# Patient Record
Sex: MALE | URBAN - METROPOLITAN AREA
[De-identification: names, ages, dates, MRNs, and addresses within clinical notes are randomized per-mention and may not be internally consistent; named-entity substitution may affect disease eponyms.]

---

## 2018-12-18 ENCOUNTER — INPATIENT (INPATIENT)
Facility: HOSPITAL | Age: 63
LOS: 2 days | DRG: 208 | End: 2018-12-21
Payer: COMMERCIAL

## 2018-12-18 VITALS — TEMPERATURE: 99 F

## 2018-12-18 DIAGNOSIS — Z78.9 OTHER SPECIFIED HEALTH STATUS: ICD-10-CM

## 2018-12-18 DIAGNOSIS — Z66 DO NOT RESUSCITATE: ICD-10-CM

## 2018-12-18 DIAGNOSIS — J96.90 RESPIRATORY FAILURE, UNSPECIFIED, UNSPECIFIED WHETHER WITH HYPOXIA OR HYPERCAPNIA: ICD-10-CM

## 2018-12-18 DIAGNOSIS — Z51.5 ENCOUNTER FOR PALLIATIVE CARE: ICD-10-CM

## 2018-12-18 DIAGNOSIS — Z71.0 PERSON ENCOUNTERING HEALTH SERVICES TO CONSULT ON BEHALF OF ANOTHER PERSON: ICD-10-CM

## 2018-12-18 DIAGNOSIS — Z71.89 OTHER SPECIFIED COUNSELING: ICD-10-CM

## 2018-12-18 DIAGNOSIS — I46.9 CARDIAC ARREST, CAUSE UNSPECIFIED: ICD-10-CM

## 2018-12-18 LAB
ALBUMIN SERPL ELPH-MCNC: 3 G/DL — LOW (ref 3.3–5)
ALBUMIN SERPL ELPH-MCNC: 3.1 G/DL — LOW (ref 3.3–5)
ALP SERPL-CCNC: 89 U/L — SIGNIFICANT CHANGE UP (ref 40–120)
ALP SERPL-CCNC: 94 U/L — SIGNIFICANT CHANGE UP (ref 40–120)
ALT FLD-CCNC: 258 U/L — HIGH (ref 10–45)
ALT FLD-CCNC: 300 U/L — HIGH (ref 10–45)
ANION GAP SERPL CALC-SCNC: 15 MMOL/L — SIGNIFICANT CHANGE UP (ref 5–17)
ANION GAP SERPL CALC-SCNC: 21 MMOL/L — HIGH (ref 5–17)
ANION GAP SERPL CALC-SCNC: 22 MMOL/L — HIGH (ref 5–17)
ANISOCYTOSIS BLD QL: SLIGHT — SIGNIFICANT CHANGE UP
APTT BLD: 103.5 SEC — HIGH (ref 27.5–36.3)
AST SERPL-CCNC: 354 U/L — HIGH (ref 10–40)
AST SERPL-CCNC: 472 U/L — HIGH (ref 10–40)
BASE EXCESS BLDA CALC-SCNC: -1.1 MMOL/L — SIGNIFICANT CHANGE UP (ref -2–3)
BASE EXCESS BLDA CALC-SCNC: -2.4 MMOL/L — LOW (ref -2–3)
BASE EXCESS BLDA CALC-SCNC: 4.7 MMOL/L — HIGH (ref -2–3)
BILIRUB SERPL-MCNC: 0.4 MG/DL — SIGNIFICANT CHANGE UP (ref 0.2–1.2)
BILIRUB SERPL-MCNC: 0.8 MG/DL — SIGNIFICANT CHANGE UP (ref 0.2–1.2)
BUN SERPL-MCNC: 35 MG/DL — HIGH (ref 7–23)
BUN SERPL-MCNC: 40 MG/DL — HIGH (ref 7–23)
BUN SERPL-MCNC: 46 MG/DL — HIGH (ref 7–23)
CALCIUM SERPL-MCNC: 8.4 MG/DL — SIGNIFICANT CHANGE UP (ref 8.4–10.5)
CALCIUM SERPL-MCNC: 8.6 MG/DL — SIGNIFICANT CHANGE UP (ref 8.4–10.5)
CALCIUM SERPL-MCNC: 8.7 MG/DL — SIGNIFICANT CHANGE UP (ref 8.4–10.5)
CHLORIDE SERPL-SCNC: 96 MMOL/L — SIGNIFICANT CHANGE UP (ref 96–108)
CHLORIDE SERPL-SCNC: 96 MMOL/L — SIGNIFICANT CHANGE UP (ref 96–108)
CHLORIDE SERPL-SCNC: 98 MMOL/L — SIGNIFICANT CHANGE UP (ref 96–108)
CK MB CFR SERPL CALC: 16.4 NG/ML — HIGH (ref 0–6.7)
CK MB CFR SERPL CALC: 16.8 NG/ML — HIGH (ref 0–6.7)
CK SERPL-CCNC: 601 U/L — HIGH (ref 30–200)
CK SERPL-CCNC: 637 U/L — HIGH (ref 30–200)
CO2 SERPL-SCNC: 24 MMOL/L — SIGNIFICANT CHANGE UP (ref 22–31)
CO2 SERPL-SCNC: 26 MMOL/L — SIGNIFICANT CHANGE UP (ref 22–31)
CO2 SERPL-SCNC: 29 MMOL/L — SIGNIFICANT CHANGE UP (ref 22–31)
CREAT SERPL-MCNC: 1.63 MG/DL — HIGH (ref 0.5–1.3)
CREAT SERPL-MCNC: 1.77 MG/DL — HIGH (ref 0.5–1.3)
CREAT SERPL-MCNC: 2.07 MG/DL — HIGH (ref 0.5–1.3)
GAS PNL BLDA: SIGNIFICANT CHANGE UP
GIANT PLATELETS BLD QL SMEAR: PRESENT — SIGNIFICANT CHANGE UP
GLUCOSE BLDC GLUCOMTR-MCNC: 219 MG/DL — HIGH (ref 70–99)
GLUCOSE SERPL-MCNC: 218 MG/DL — HIGH (ref 70–99)
GLUCOSE SERPL-MCNC: 252 MG/DL — HIGH (ref 70–99)
GLUCOSE SERPL-MCNC: 271 MG/DL — HIGH (ref 70–99)
HBA1C BLD-MCNC: 8.2 % — HIGH (ref 4–5.6)
HCO3 BLDA-SCNC: 28 MMOL/L — SIGNIFICANT CHANGE UP (ref 21–28)
HCO3 BLDA-SCNC: 31 MMOL/L — HIGH (ref 21–28)
HCO3 BLDA-SCNC: 34 MMOL/L — HIGH (ref 21–28)
HCT VFR BLD CALC: 52.9 % — HIGH (ref 39–50)
HGB BLD-MCNC: 16.4 G/DL — SIGNIFICANT CHANGE UP (ref 13–17)
LACTATE SERPL-SCNC: 2.8 MMOL/L — HIGH (ref 0.5–2)
LACTATE SERPL-SCNC: 4.6 MMOL/L — CRITICAL HIGH (ref 0.5–2)
LG PLATELETS BLD QL AUTO: PRESENT — SIGNIFICANT CHANGE UP
LYMPHOCYTES # BLD AUTO: 8 % — LOW (ref 13–44)
MACROCYTES BLD QL: SLIGHT — SIGNIFICANT CHANGE UP
MANUAL DIF COMMENT BLD-IMP: SIGNIFICANT CHANGE UP
MANUAL SMEAR VERIFICATION: SIGNIFICANT CHANGE UP
MCHC RBC-ENTMCNC: 30 PG — SIGNIFICANT CHANGE UP (ref 27–34)
MCHC RBC-ENTMCNC: 31 G/DL — LOW (ref 32–36)
MCV RBC AUTO: 96.7 FL — SIGNIFICANT CHANGE UP (ref 80–100)
MICROCYTES BLD QL: SLIGHT — SIGNIFICANT CHANGE UP
MONOCYTES NFR BLD AUTO: 10 % — SIGNIFICANT CHANGE UP (ref 2–14)
NEUTROPHILS NFR BLD AUTO: 37 % — LOW (ref 43–77)
NEUTS BAND # BLD: 45 % — HIGH
PCO2 BLDA: 103 MMHG — CRITICAL HIGH (ref 35–48)
PCO2 BLDA: 66 MMHG — CRITICAL HIGH (ref 35–48)
PCO2 BLDA: 72 MMHG — CRITICAL HIGH (ref 35–48)
PH BLDA: 7.1 — CRITICAL LOW (ref 7.35–7.45)
PH BLDA: 7.25 — LOW (ref 7.35–7.45)
PH BLDA: 7.29 — LOW (ref 7.35–7.45)
PLAT MORPH BLD: ABNORMAL
PLATELET # BLD AUTO: 125 K/UL — LOW (ref 150–400)
PO2 BLDA: 393 MMHG — HIGH (ref 83–108)
PO2 BLDA: 434 MMHG — HIGH (ref 83–108)
PO2 BLDA: 70 MMHG — LOW (ref 83–108)
POLYCHROMASIA BLD QL SMEAR: SLIGHT — SIGNIFICANT CHANGE UP
POTASSIUM SERPL-MCNC: 4.6 MMOL/L — SIGNIFICANT CHANGE UP (ref 3.5–5.3)
POTASSIUM SERPL-MCNC: 4.7 MMOL/L — SIGNIFICANT CHANGE UP (ref 3.5–5.3)
POTASSIUM SERPL-MCNC: 4.9 MMOL/L — SIGNIFICANT CHANGE UP (ref 3.5–5.3)
POTASSIUM SERPL-SCNC: 4.6 MMOL/L — SIGNIFICANT CHANGE UP (ref 3.5–5.3)
POTASSIUM SERPL-SCNC: 4.7 MMOL/L — SIGNIFICANT CHANGE UP (ref 3.5–5.3)
POTASSIUM SERPL-SCNC: 4.9 MMOL/L — SIGNIFICANT CHANGE UP (ref 3.5–5.3)
PROT SERPL-MCNC: 5.4 G/DL — LOW (ref 6–8.3)
PROT SERPL-MCNC: 5.8 G/DL — LOW (ref 6–8.3)
RBC # BLD: 5.47 M/UL — SIGNIFICANT CHANGE UP (ref 4.2–5.8)
RBC # FLD: 14.7 % — SIGNIFICANT CHANGE UP (ref 10.3–16.9)
RBC BLD AUTO: ABNORMAL
SAO2 % BLDA: 100 % — SIGNIFICANT CHANGE UP (ref 95–100)
SAO2 % BLDA: 100 % — SIGNIFICANT CHANGE UP (ref 95–100)
SAO2 % BLDA: 91 % — LOW (ref 95–100)
SODIUM SERPL-SCNC: 142 MMOL/L — SIGNIFICANT CHANGE UP (ref 135–145)
SODIUM SERPL-SCNC: 142 MMOL/L — SIGNIFICANT CHANGE UP (ref 135–145)
SODIUM SERPL-SCNC: 143 MMOL/L — SIGNIFICANT CHANGE UP (ref 135–145)
TROPONIN T SERPL-MCNC: 0.1 NG/ML — CRITICAL HIGH (ref 0–0.01)
TROPONIN T SERPL-MCNC: 0.12 NG/ML — CRITICAL HIGH (ref 0–0.01)
TSH SERPL-MCNC: 0.78 UIU/ML — SIGNIFICANT CHANGE UP (ref 0.35–4.94)
WBC # BLD: 17.4 K/UL — HIGH (ref 3.8–10.5)
WBC # FLD AUTO: 17.4 K/UL — HIGH (ref 3.8–10.5)

## 2018-12-18 PROCEDURE — 92950 HEART/LUNG RESUSCITATION CPR: CPT

## 2018-12-18 PROCEDURE — 32551 INSERTION OF CHEST TUBE: CPT

## 2018-12-18 PROCEDURE — 71045 X-RAY EXAM CHEST 1 VIEW: CPT | Mod: 26

## 2018-12-18 PROCEDURE — 31500 INSERT EMERGENCY AIRWAY: CPT

## 2018-12-18 PROCEDURE — 99223 1ST HOSP IP/OBS HIGH 75: CPT | Mod: 24,25

## 2018-12-18 PROCEDURE — 99291 CRITICAL CARE FIRST HOUR: CPT

## 2018-12-18 PROCEDURE — 99497 ADVNCD CARE PLAN 30 MIN: CPT | Mod: 25

## 2018-12-18 PROCEDURE — 36556 INSERT NON-TUNNEL CV CATH: CPT | Mod: GC

## 2018-12-18 PROCEDURE — 99291 CRITICAL CARE FIRST HOUR: CPT | Mod: 25

## 2018-12-18 PROCEDURE — 36620 INSERTION CATHETER ARTERY: CPT | Mod: GC

## 2018-12-18 PROCEDURE — 99223 1ST HOSP IP/OBS HIGH 75: CPT

## 2018-12-18 PROCEDURE — 71045 X-RAY EXAM CHEST 1 VIEW: CPT | Mod: 26,77

## 2018-12-18 RX ORDER — SODIUM CHLORIDE 9 MG/ML
1000 INJECTION INTRAMUSCULAR; INTRAVENOUS; SUBCUTANEOUS ONCE
Qty: 0 | Refills: 0 | Status: COMPLETED | OUTPATIENT
Start: 2018-12-18 | End: 2018-12-18

## 2018-12-18 RX ORDER — SODIUM BICARBONATE 1 MEQ/ML
50 SYRINGE (ML) INTRAVENOUS ONCE
Qty: 0 | Refills: 0 | Status: COMPLETED | OUTPATIENT
Start: 2018-12-18 | End: 2018-12-18

## 2018-12-18 RX ORDER — LEVOTHYROXINE SODIUM 125 MCG
25 TABLET ORAL AT BEDTIME
Qty: 0 | Refills: 0 | Status: DISCONTINUED | OUTPATIENT
Start: 2018-12-19 | End: 2018-12-20

## 2018-12-18 RX ORDER — CHOLECALCIFEROL (VITAMIN D3) 125 MCG
15000 CAPSULE ORAL
Qty: 0 | Refills: 0 | COMMUNITY

## 2018-12-18 RX ORDER — DEXTROSE 50 % IN WATER 50 %
25 SYRINGE (ML) INTRAVENOUS ONCE
Qty: 0 | Refills: 0 | Status: DISCONTINUED | OUTPATIENT
Start: 2018-12-18 | End: 2018-12-20

## 2018-12-18 RX ORDER — NOREPINEPHRINE BITARTRATE/D5W 8 MG/250ML
0.05 PLASTIC BAG, INJECTION (ML) INTRAVENOUS
Qty: 32 | Refills: 0 | Status: DISCONTINUED | OUTPATIENT
Start: 2018-12-18 | End: 2018-12-19

## 2018-12-18 RX ORDER — SODIUM CHLORIDE 9 MG/ML
1000 INJECTION, SOLUTION INTRAVENOUS
Qty: 0 | Refills: 0 | Status: DISCONTINUED | OUTPATIENT
Start: 2018-12-18 | End: 2018-12-20

## 2018-12-18 RX ORDER — CHLORHEXIDINE GLUCONATE 213 G/1000ML
1 SOLUTION TOPICAL
Qty: 0 | Refills: 0 | Status: DISCONTINUED | OUTPATIENT
Start: 2018-12-18 | End: 2018-12-21

## 2018-12-18 RX ORDER — ATORVASTATIN CALCIUM 80 MG/1
1 TABLET, FILM COATED ORAL
Qty: 0 | Refills: 0 | COMMUNITY

## 2018-12-18 RX ORDER — INSULIN LISPRO 100/ML
VIAL (ML) SUBCUTANEOUS EVERY 6 HOURS
Qty: 0 | Refills: 0 | Status: DISCONTINUED | OUTPATIENT
Start: 2018-12-18 | End: 2018-12-20

## 2018-12-18 RX ORDER — LEVOTHYROXINE SODIUM 125 MCG
1 TABLET ORAL
Qty: 0 | Refills: 0 | COMMUNITY

## 2018-12-18 RX ORDER — GLUCAGON INJECTION, SOLUTION 0.5 MG/.1ML
1 INJECTION, SOLUTION SUBCUTANEOUS ONCE
Qty: 0 | Refills: 0 | Status: DISCONTINUED | OUTPATIENT
Start: 2018-12-18 | End: 2018-12-20

## 2018-12-18 RX ORDER — PROPOFOL 10 MG/ML
5 INJECTION, EMULSION INTRAVENOUS
Qty: 1000 | Refills: 0 | Status: DISCONTINUED | OUTPATIENT
Start: 2018-12-18 | End: 2018-12-19

## 2018-12-18 RX ORDER — SODIUM CHLORIDE 9 MG/ML
1000 INJECTION INTRAMUSCULAR; INTRAVENOUS; SUBCUTANEOUS
Qty: 0 | Refills: 0 | Status: DISCONTINUED | OUTPATIENT
Start: 2018-12-18 | End: 2018-12-18

## 2018-12-18 RX ORDER — ASPIRIN/CALCIUM CARB/MAGNESIUM 324 MG
1 TABLET ORAL
Qty: 0 | Refills: 0 | COMMUNITY

## 2018-12-18 RX ORDER — HEPARIN SODIUM 5000 [USP'U]/ML
1400 INJECTION INTRAVENOUS; SUBCUTANEOUS
Qty: 25000 | Refills: 0 | Status: DISCONTINUED | OUTPATIENT
Start: 2018-12-18 | End: 2018-12-19

## 2018-12-18 RX ORDER — DEXTROSE 50 % IN WATER 50 %
15 SYRINGE (ML) INTRAVENOUS ONCE
Qty: 0 | Refills: 0 | Status: DISCONTINUED | OUTPATIENT
Start: 2018-12-18 | End: 2018-12-20

## 2018-12-18 RX ORDER — LATANOPROST 0.05 MG/ML
1 SOLUTION/ DROPS OPHTHALMIC; TOPICAL
Qty: 0 | Refills: 0 | COMMUNITY

## 2018-12-18 RX ORDER — DEXTROSE 50 % IN WATER 50 %
12.5 SYRINGE (ML) INTRAVENOUS ONCE
Qty: 0 | Refills: 0 | Status: DISCONTINUED | OUTPATIENT
Start: 2018-12-18 | End: 2018-12-20

## 2018-12-18 RX ORDER — LEVETIRACETAM 250 MG/1
1000 TABLET, FILM COATED ORAL ONCE
Qty: 0 | Refills: 0 | Status: COMPLETED | OUTPATIENT
Start: 2018-12-18 | End: 2018-12-18

## 2018-12-18 RX ORDER — VASOPRESSIN 20 [USP'U]/ML
0.04 INJECTION INTRAVENOUS
Qty: 100 | Refills: 0 | Status: DISCONTINUED | OUTPATIENT
Start: 2018-12-18 | End: 2018-12-19

## 2018-12-18 RX ORDER — IPRATROPIUM/ALBUTEROL SULFATE 18-103MCG
3 AEROSOL WITH ADAPTER (GRAM) INHALATION EVERY 4 HOURS
Qty: 0 | Refills: 0 | Status: DISCONTINUED | OUTPATIENT
Start: 2018-12-18 | End: 2018-12-21

## 2018-12-18 RX ORDER — METFORMIN HYDROCHLORIDE 850 MG/1
1 TABLET ORAL
Qty: 0 | Refills: 0 | COMMUNITY

## 2018-12-18 RX ORDER — TIMOLOL 0.5 %
1 DROPS OPHTHALMIC (EYE)
Qty: 0 | Refills: 0 | COMMUNITY

## 2018-12-18 RX ORDER — CHLORHEXIDINE GLUCONATE 213 G/1000ML
15 SOLUTION TOPICAL EVERY 12 HOURS
Qty: 0 | Refills: 0 | Status: DISCONTINUED | OUTPATIENT
Start: 2018-12-18 | End: 2018-12-21

## 2018-12-18 RX ORDER — MIDAZOLAM HYDROCHLORIDE 1 MG/ML
5 INJECTION, SOLUTION INTRAMUSCULAR; INTRAVENOUS ONCE
Qty: 0 | Refills: 0 | Status: DISCONTINUED | OUTPATIENT
Start: 2018-12-18 | End: 2018-12-18

## 2018-12-18 RX ORDER — NOREPINEPHRINE BITARTRATE/D5W 8 MG/250ML
0.05 PLASTIC BAG, INJECTION (ML) INTRAVENOUS
Qty: 16 | Refills: 0 | Status: DISCONTINUED | OUTPATIENT
Start: 2018-12-18 | End: 2018-12-18

## 2018-12-18 RX ADMIN — MIDAZOLAM HYDROCHLORIDE 5 MILLIGRAM(S): 1 INJECTION, SOLUTION INTRAMUSCULAR; INTRAVENOUS at 14:38

## 2018-12-18 RX ADMIN — SODIUM CHLORIDE 2000 MILLILITER(S): 9 INJECTION INTRAMUSCULAR; INTRAVENOUS; SUBCUTANEOUS at 13:32

## 2018-12-18 RX ADMIN — PROPOFOL 2.4 MICROGRAM(S)/KG/MIN: 10 INJECTION, EMULSION INTRAVENOUS at 13:54

## 2018-12-18 RX ADMIN — Medication 3 MILLILITER(S): at 15:42

## 2018-12-18 RX ADMIN — SODIUM CHLORIDE 1000 MILLILITER(S): 9 INJECTION INTRAMUSCULAR; INTRAVENOUS; SUBCUTANEOUS at 14:49

## 2018-12-18 RX ADMIN — PROPOFOL 2.4 MICROGRAM(S)/KG/MIN: 10 INJECTION, EMULSION INTRAVENOUS at 21:23

## 2018-12-18 RX ADMIN — LEVETIRACETAM 400 MILLIGRAM(S): 250 TABLET, FILM COATED ORAL at 13:54

## 2018-12-18 RX ADMIN — Medication 1 MILLIGRAM(S): at 13:54

## 2018-12-18 RX ADMIN — Medication 3 MILLILITER(S): at 21:50

## 2018-12-18 RX ADMIN — HEPARIN SODIUM 14 UNIT(S)/HR: 5000 INJECTION INTRAVENOUS; SUBCUTANEOUS at 14:48

## 2018-12-18 RX ADMIN — Medication 3.75 MICROGRAM(S)/KG/MIN: at 18:20

## 2018-12-18 RX ADMIN — LEVETIRACETAM 1000 MILLIGRAM(S): 250 TABLET, FILM COATED ORAL at 14:30

## 2018-12-18 RX ADMIN — SODIUM CHLORIDE 1000 MILLILITER(S): 9 INJECTION INTRAMUSCULAR; INTRAVENOUS; SUBCUTANEOUS at 13:32

## 2018-12-18 RX ADMIN — VASOPRESSIN 2.4 UNIT(S)/MIN: 20 INJECTION INTRAVENOUS at 14:48

## 2018-12-18 RX ADMIN — CHLORHEXIDINE GLUCONATE 15 MILLILITER(S): 213 SOLUTION TOPICAL at 19:09

## 2018-12-18 RX ADMIN — SODIUM CHLORIDE 125 MILLILITER(S): 9 INJECTION INTRAMUSCULAR; INTRAVENOUS; SUBCUTANEOUS at 13:32

## 2018-12-18 RX ADMIN — Medication 50 MILLIEQUIVALENT(S): at 15:38

## 2018-12-18 NOTE — CONSULT NOTE ADULT - ATTENDING COMMENTS
Assessment: Patient personally seen and examined myself during rounds with the House Staff/Fellow  ON DATE 12/18/18  House Staff/Fellow note read, including vitals, physical findings, laboratory data, and radiological reports.   Revisions included below.   Direct personal management at bed side and extensive interpretation of the data.    Plan was outlined and discussed in details with the House Staff/Fellow.    Decision making of high complexity   Risk high of complications, morbidity, and/or mortality  Assessment and Action taken for acute disease activity to reflect the level of care provided:  -Hemodynamic evaluation and support  -ACS assessment and treatment as applicable  -Heart failure assessment and treatment as applicable  -Cardiac Telemetry reviewed  -Medication reconciliation  -Review laboratory data  -EKG reviewed   -Echo reviewed  -Interdisciplinary discussion with IC / EP / HF / CTS teams as needed  My plan includes :  close hemodynamic monitoring and management   Monitor for arrhythmias and monitor parameters for organ perfusion  monitor neurologic status  Head of the bed should remain elevated to 45 deg .   chest PT and IS will be encouraged  monitor adequacy of oxygenation and ventilation and attempt to wean oxygen  Nutritional goals will be met using po eventually , ensure adequate caloric intake and montior the same  Stress ulcer and VTE prophylaxis will be achieved    Glycemic control is satisfactory  Electrolytes have been replete as necessary and wound care has been carried out. Pain control has been achieved.   aggressive physical therapy and early mobility and ambulation goals will be met   The family was updated about the course and plan  CRITICAL CARE TIME SPENT in evaluation and management, reassessments, review and interpretation of labs and x-rays, ventilator and hemodynamic management, formulating a plan and coordinating care: ___90____ MIN.  Time does not include procedural time.    Brenden Krueger MD  Mobile: [phone]
Patient seen and examined with house-staff during bedside rounds.  Resident note read, including vitals, physical findings, laboratory data, and radiological reports.   Revisions included below.  Direct personal management at bed side and extensive interpretation of the data.  Plan was outlined and discussed in details with the housestaff.  Decision making of high complexity  Action taken for acute disease activity to reflect the level of care provided:  - medication reconciliation  - review laboratory data  respiratory failure secondary to cardiac arrest with respiratory acidosis, shock state, copd, renal failure, tonic colonic seizureposisble anoxic encephalopathy  discussed with wife and palliative medicine and patient is DNR. TLC and A line inserted  ventilator adjusted  on pressors  EEG

## 2018-12-18 NOTE — CONSULT NOTE ADULT - ASSESSMENT
A/P: 63M w/hx of asthma, DM2, & HTN who presented to [facility] ED in cardiac arrest, admitted to the MICU     #Post-Cardiac Arrest - likely 2/2 PE based on echo findings. Low suspicion for hypoxia 2/2 intrapulmonary process (no signs of PNA, low suspicion for exacerbation of asthma or COPD), acidosis, hypo/hyperkalemia, hypothermia, toxins, ACS per cardiology recs. Pneumothorax noted on initial CXR, but that likely represents iatrogenic 2/2 CPR.  - will start patient on heparin gtt for PE treatment   - can consider CT-PE if patient clinically stable; currently not a candidate to travel for scan  - LE Duplex US  - f/u official echo read  - maintain MAP > 65; currently on levophed gtt + vasopressin gtt  - palliative consulted in ED; f/u ongoing GOC discussion    Dispo: Admit to MICU A/P: 63M w/hx of asthma/COPD, DM2, & HTN who presented to [facility] ED in cardiac arrest, admitted to the MICU     #Post-Cardiac Arrest - likely 2/2 PE based on echo findings. Low suspicion for hypoxia 2/2 intrapulmonary process (no signs of PNA, low suspicion for exacerbation of asthma or COPD based on physical exam and hx), acidosis, hypo/hyperkalemia, hypothermia, toxins, ACS per cardiology recs. Pneumothorax noted on initial CXR, but that likely represents iatrogenic 2/2 CPR.  - will start patient on heparin gtt for PE treatment   - can consider CT-PE if patient clinically stable; currently not a candidate to travel for scan  - LE Duplex US  - f/u official echo read  - maintain MAP > 65; currently on levophed gtt + vasopressin gtt  - palliative consulted in ED; f/u ongoing GOC discussion    Dispo: Admit to MICU

## 2018-12-18 NOTE — CONSULT NOTE ADULT - ASSESSMENT
64 yo man with history of asthma, T2DM, and HTN who presents in cardiac arrest. Patient was found outside hospital in a taxi by ED nurse and CPR was initiated on the street. In ED, patient was in asystole, ACLS protocol began,

## 2018-12-18 NOTE — ED PROVIDER NOTE - MEDICAL DECISION MAKING DETAILS
Pt arrived in cardiac arrest.  CPR in progress, acls started - asystole on monitor initially, pt then w wide complex tachycardia 180's - shock x 1 for presumed vtach, nsr w wide complex on monitor ~ 100 on monitor after shock.  Pt intubated w glidescope but w upper airway noises.  CXR for tube placement done and ett appeared high - tube advanced w cont abnl noises.  Additional cxr w cont high ett.  ETT removed and replaced w glidescope w improved placement on cxr.   Pt noted to have ptx at that time w/o tension - ct surg consulted and placed chest tube w improved ptx on repeat cxr.  Pt eval by cardiology early in ed course and felt to not have cardiac etiology but likely resp etiology of arrest; we discussed tpa for poss pe but noted to have ptx prior to starting tpa so tpa held and then noted to have neg ddimer so no sig concern for pe and tpa canceled.  Pt given magnesium, steroids for poss copd exacerbation as etiology of resp arrest/sob. MICU consulted and agreed to admit.  Pt's vent settings adjusted 2/2 abnl pH and co2 w micu to cont to adjust and assess resp status. Pt arrived in cardiac arrest.  CPR in progress, acls started - asystole on monitor initially, pt then w wide complex tachycardia 180's - shock x 1 for presumed vtach, nsr w wide complex on monitor ~ 100 on monitor after shock.  Pt intubated w glidescope but w upper airway noises.  CXR for tube placement done and ett appeared high - tube advanced w cont abnl noises.  Additional cxr w cont high ett.  ETT removed and replaced w glidescope w improved placement on cxr.   Pt noted to have ptx at that time w/o tension, suspect 2/2 chest compressions or bleb rupture in copd pt - ct surg consulted and placed chest tube w improved ptx on repeat cxr.  Pt eval by cardiology early in ed course and felt to not have cardiac etiology but likely resp etiology of arrest; we discussed tpa for poss pe but noted to have ptx prior to starting tpa so tpa held until after chest tube and then noted to have neg ddimer so less concern for pe and tpa canceled.  Pt given magnesium, steroids for poss copd exacerbation as etiology of resp arrest/sob. MICU consulted and agreed to admit.  Pt's vent settings adjusted 2/2 abnl pH and co2 w micu to cont to adjust and assess resp status.

## 2018-12-18 NOTE — H&P ADULT - ASSESSMENT
This is a 64 yo man with unknown This is a 62 yo man with unknown past medical history who presented from pulmonologist's office for *** x1 week admitted for cardiac arrest s/p ROSC.    PLAN    Cardiovascular  #Hemodynamics  - Hemodynamically unstable    #s/p cardiac arrest with ROSC    #Elevated BNP and troponemia  - likely 2/2 demand ischemia  - trend trop    Pulmonary  #Acute hypoxic respiratory distress    Renal  #Respiratory acidosis    #Metabolic alkalosis    #ANGELY    ID  #Leukocytosis 62 y/o M heavy smoker with a history of COPD, NIDDM, and HTN, admitted to MICU post PEA arrest, ROSC achieved after 30 minutes. Course complicated by R pneumothorax s/p chest tube. Now on heparin gtt for ACS.    Cardiovascular  #PEA arrest with ROSC. Etiology unclear. Possibly due to pulmonary embolus. Less likely COPD exacerbation given the history and physical. Differential also includes ACS, although troponin 0.04 and is unlikely. Patient presents with pneumothorax, but this is likely iatrogenic due to chest compressions.  - Continue with heparin gtt for PE.  - Consider CTA PE when patient is clinically stable.  - LE duplex to r/o DVT.  - Follow up TTE read.  - maintain MAP > 65; currently on levophed gtt + vasopressin gtt.  - palliative consulted in ED; f/u ongoing GOC discussion    #Hemodynamics  - Hemodynamically unstable on levophed.    Pulmonary  #Acute hypoxic respiratory failure secondary to cardiac arrest.  - Continue on mechanical ventilation at this time.    Renal  #ANGELY. Likely due to prerenal in the setting of hypoperfusion due to cardiac arrest.  - Trend BMP.  - Urine electrolytes if renal function continues to worsen.  - Avoid nephrotoxic meds    F: No IVF indicated at this time.  E: replete electrolytes prn.  N: NPO while intubated.    Dispo: MICU for post-cardiac arrest monitoring.  Code: DNR 64 y/o M heavy smoker with a history of COPD, NIDDM, and HTN, admitted to MICU post PEA arrest, ROSC achieved after 30 minutes. Course complicated by R pneumothorax s/p chest tube. Now on heparin gtt for presumed PE.    Cardiovascular  #PEA arrest with ROSC. Etiology unclear. Possibly due to pulmonary embolus. Less likely COPD exacerbation given the history and physical. Differential also includes ACS, although troponin 0.04 and is unlikely. Patient presents with pneumothorax, but this is likely iatrogenic due to chest compressions.  - Continue with heparin gtt for PE.  - Consider CTA PE when patient is clinically stable.  - LE duplex to r/o DVT.  - Follow up TTE read.  - maintain MAP > 65; currently on levophed gtt + vasopressin gtt.  - palliative consulted in ED; f/u ongoing GOC discussion    #Hemodynamics  - Hemodynamically unstable on levophed.    Pulmonary  #Acute hypoxic respiratory failure secondary to cardiac arrest.  - Continue on mechanical ventilation at this time.    Renal  #ANGELY. Likely due to prerenal in the setting of hypoperfusion due to cardiac arrest.  - Trend BMP.  - Urine electrolytes if renal function continues to worsen.  - Avoid nephrotoxic meds    F: No IVF indicated at this time.  E: replete electrolytes prn.  N: NPO while intubated.    Dispo: MICU for post-cardiac arrest monitoring.  Code: DNR 62 y/o M heavy smoker with a history of COPD, NIDDM, and HTN, admitted to MICU post PEA arrest, ROSC achieved after 30 minutes. Course complicated by R pneumothorax s/p chest tube. Now on heparin gtt for presumed PE.    Cardiovascular  #PEA arrest with ROSC. Likely 2/2 pulmonary embolus. Less likely COPD exacerbation given the history and physical. Differential also includes ACS, although troponin 0.04 so less likely. Patient presents with pneumothorax, but this is likely iatrogenic due to chest compressions.  - Continue with heparin gtt for suspected PE  - Consider CTA PE when patient is clinically stable  - LE duplex to r/o DVT  - TTE showing RV dilation with reduced function, hyperkinetic apex consistent with R heart strain, and mild concentric LVH  - maintain MAP > 65; currently on levophed gtt + vasopressin gtt.  - palliative consulted in ED; f/u ongoing GOC discussion    #Hemodynamics  - Hemodynamically unstable on levophed    Pulmonary  #Acute hypoxic respiratory failure secondary to cardiac arrest.  - Continue on mechanical ventilation at this time    #r/o massive PE  - EKG showing RBBB; Twave in V2-V4; biphasic p waves in II and III  - TTE with evidence of R heart strain  - Continue heparin ggt  - Consider CTa PE when patient  more stable    Renal  #ANGELY. Likely due to prerenal in the setting of hypoperfusion due to cardiac arrest.  - Trend BMP.  - Urine electrolytes if renal function continues to worsen.  - Avoid nephrotoxic meds    F: No IVF indicated at this time.  E: replete electrolytes prn.  N: NPO while intubated.    Dispo: MICU for post-cardiac arrest monitoring.  Code: DNR

## 2018-12-18 NOTE — H&P ADULT - NSHPSOCIALHISTORY_GEN_ALL_CORE
SOCIAL HISTORY: "Heavy" smoker per PMD. [place] speaking Originally from [place] in the [place]. Remarried 20 years. Has adult children from prior marriage. All other family in [facility]. Was living with wife in [facility] apartment. [employer].

## 2018-12-18 NOTE — CONSULT NOTE ADULT - SUBJECTIVE AND OBJECTIVE BOX
BRIAN WEI          MRN-[id number]            (1955)    HPI:  This is a 62 yo man with history of asthma, T2DM, and HTN who presents in cardiac arrest. Patient was found outside hospital in a taxi by ED nurse and CPR was initiated on the street. In ED, patient was in asystole, ACLS protocol began, and patient was intubated- patient received 4x epi with ROSC at 12:12. Patient received Keppra 1g, Ativan 1mg, 2L NS, started on versed, propofol, and vasopressor ggt. (18 Dec 2018 14:22)    PAST MEDICAL & SURGICAL HISTORY:  DM  Bronchitis  Asthma  Heart disease    FAMILY HISTORY:  Brother  at 51 yo from heart disease  Parents  (Both over 79 yo)    SOCIAL HISTORY: Hungarian speaking Originally from [facility] Here in the  for 30years. Remarried 20 years. Has adult children from prior marriage. All other family in [facility]. Was living with wife in [facility] apartment. [facility]    ROS:    Unable to attain due to:  Medical condition                    Dyspnea (Jose 0-10):  7 Mechanically vented                      N/V (Y/N): Unable to assess                             Secretions (Y/N) :  Yes              Agitation(Y/N): No  Pain (Y/N): MANDEEP pain scale: 4      -Provocation/Palliation: Unable to assess       -Quality/Quantity: Unable to assess       -Radiating: Unable to assess       -Severity: Mild  -Timing/Frequency: Unable to assess       -Impact on ADLs: Unable to assess         Allergies: No Known Allergies or Intolerances    Opiate Naive (Y/N): Yes  -iStop reviewed (Y/N): Yes. No Rx found on iStop review (Ref#:[id number])    Medications:      MEDICATIONS  (STANDING):  ALBUTerol/ipratropium for Nebulization 3 milliLiter(s) Nebulizer every 4 hours  heparin  Infusion 1400 Unit(s)/Hr (14 mL/Hr) IV Continuous <Continuous>  propofol Infusion 5 MICROgram(s)/kG/Min (2.4 mL/Hr) IV Continuous <Continuous>  sodium chloride 0.9%. 1000 milliLiter(s) (125 mL/Hr) IV Continuous <Continuous>  vasopressin Infusion 0.04 Unit(s)/Min (2.4 mL/Hr) IV Continuous <Continuous>    Labs:    CBC:                        16.2   13.1  )-----------( 169      ( 18 Dec 2018 12:38 )             52.6     CMP:        142  |  98  |  35<H>  ----------------------------<  218<H>  4.6   |  29  |  1.63<H>    Ca    8.7      18 Dec 2018 14:16    TPro  5.4<L>  /  Alb  3.0<L>  /  TBili  0.4  /  DBili  x   /  AST  354<H>  /  ALT  258<H>  /  AlkPhos  89      PT/INR - ( 18 Dec 2018 12:38 )   PT: 12.1 sec;   INR: 1.07     PTT - ( 18 Dec 2018 12:38 )  PTT:33.4 sec    Lactate, Blood: 4.6 moL/L (.18 @ 14:16)    Blood Gas Profile - Arterial (18 @ 14:15)    pH, Arterial: 7.10: 18 14:23  Abilio carty  notified  & read back on PH & Pc02 values    pCO2, Arterial: 103 mmHg    pO2, Arterial: 393 mmHg    HCO3, Arterial: 31 mmol/L    Base Excess, Arterial: -2.4 mmol/L    Oxygen Saturation, Arterial: 100 %    Blood Gas Arterial - Calcium, Ionized: 1.31 mmol/L (12.18.18 @ 13:09)  Blood Gas Arterial - Potassium: 4.4 mmol/L (12.18.18 @ 13:09)  Blood Gas Arterial - Sodium: 136 mmol/L (12.18.18 @ 13:09)  Blood Gas Profile - Arterial (..18 @ 13:09)      pH, Arterial: 7.00: Critical blood gas results notified and read back by Jayne Brock @  18 13:17    pCO2, Arterial: 124: Critical blood gas results notified and read back by Jayne Brock @  18 13:17 mmHg    pO2, Arterial: 276 mmHg    HCO3, Arterial: 30 mmol/L    Base Excess, Arterial: -5.7 mmol/L    Oxygen Saturation, Arterial: 99 %    Serum Pro-Brain Natriuretic Peptide: 24189 g/mL (18.18 @ 12:39)    Troponin T, Serum: 0.04g/mL (12.18.18 @ 12:39)  Creatine Kinase, Serum: see note: markedly hemolyzed U/L (12.18.18 @ 12:39)    D-Dimer Assay, Quantitative: 162 g/mL DDU (12.18.18 @ 12:38)    POCT Blood Glucose.: 344: NotifiedMDClndMtr mg/dL (12.18.18 @ 11:54)    Imaging:  Pending    PEx:  T(C): 37.1 (-18 @ 13:34), Max: 37.1 (18 @ 12:42)  HR: 84 (18 @ 13:34) (0 - 84)  BP: 94/63 (18 @ 13:34) (0/0 - 94/63)  RR: 14 (18-18 @ 13:34) (0 - 14)  SpO2: 100% (18 @ 12:40) (100% - 100%)  Wt(kg): 80    General: Elderly overweight man intubated sedated unresponsive to painful stimulus.   HEENT: Fixed gaze Pupils sluggish Bleeding from nose and mouth ETT+  Neck: Supple   CVS: RR S1S2 Distant heart sounds  Resp: Mechanically vented Not overbrething the vent Occasional changes in rate due to myoclonus  GI: Globose Soft   :  Normal  Musc:   Myoclonus  No C/C    Neuro: Sedated   Psych: Unreponsive  Skin: Non juandiced  Lymph: Edema +  Preadmit Karnofsky: 80%       Current Karnofsky:  10%  Cachexia (Y/N): No  BMI: 26    Advanced Directives:     Full Code    Decision maker: The patient does not have capacity for complex medical decision making given medical condition.  Legal surrogate: No documented HCP or living will. Patient's wife Jenniffer Wei [phone]    GOALS OF CARE DISCUSSION       Palliative care info/counseling provided	           Family meeting at bedside.       Advanced Directives addressed please see Advance Care Planning Note	           Documentation of GOC: Continue current level of care. Wife deciding if will forego any further resuscitation attempts given poor prognosis.           REFERRALS	        Unit SW/Case Mgmt        - Following BRIAN WEI          MRN-[id number]            (1955)    HPI:  This is a 62 yo man with history of asthma, T2DM, and HTN who presents in cardiac arrest. Patient was found outside hospital in a taxi by ED nurse and CPR was initiated on the street. In ED, patient was in asystole, ACLS protocol began, and patient was intubated- patient received 4x epi with ROSC at 12:12. Patient received Keppra 1g, Ativan 1mg, 2L NS, started on versed, propofol, and vasopressor ggt. (18 Dec 2018 14:22)    PAST MEDICAL & SURGICAL HISTORY:  DM  Bronchitis  Asthma  Heart disease    FAMILY HISTORY:  Brother  at 49 yo from heart disease  Parents  (Both over 81 yo)    SOCIAL HISTORY: Welsh speaking Originally from [facility] Here in the  for 30years. Remarried 20 years. Has adult children from prior marriage. All other family in [facility]. Was living with wife in [facility] apartment. [facility]    ROS:    Unable to attain due to:  Medical condition                    Dyspnea (Jose 0-10):  7 Mechanically vented                      N/V (Y/N): Unable to assess                             Secretions (Y/N) :  Yes              Agitation(Y/N): No  Pain (Y/N): MANDEEP pain scale: 4      -Provocation/Palliation: Unable to assess       -Quality/Quantity: Unable to assess       -Radiating: Unable to assess       -Severity: Mild  -Timing/Frequency: Unable to assess       -Impact on ADLs: Unable to assess         Allergies: No Known Allergies or Intolerances    Opiate Naive (Y/N): Yes  -iStop reviewed (Y/N): Yes. No Rx found on iStop review (Ref#:[id number])    Medications:      MEDICATIONS  (STANDING):  ALBUTerol/ipratropium for Nebulization 3 milliLiter(s) Nebulizer every 4 hours  heparin  Infusion 1400 Unit(s)/Hr (14 mL/Hr) IV Continuous <Continuous>  propofol Infusion 5 MICROgram(s)/kG/Min (2.4 mL/Hr) IV Continuous <Continuous>  sodium chloride 0.9%. 1000 milliLiter(s) (125 mL/Hr) IV Continuous <Continuous>  vasopressin Infusion 0.04 Unit(s)/Min (2.4 mL/Hr) IV Continuous <Continuous>    Labs:    CBC:                        16.2   13.1  )-----------( 169      ( 18 Dec 2018 12:38 )             52.6     CMP:        142  |  98  |  35<H>  ----------------------------<  218<H>  4.6   |  29  |  1.63<H>    Ca    8.7      18 Dec 2018 14:16    TPro  5.4<L>  /  Alb  3.0<L>  /  TBili  0.4  /  DBili  x   /  AST  354<H>  /  ALT  258<H>  /  AlkPhos  89      PT/INR - ( 18 Dec 2018 12:38 )   PT: 12.1 sec;   INR: 1.07     PTT - ( 18 Dec 2018 12:38 )  PTT:33.4 sec    Lactate, Blood: 4.6 moL/L (.18 @ 14:16)    Blood Gas Profile - Arterial (18 @ 14:15)    pH, Arterial: 7.10: 18 14:23  Abilio carty  notified  & read back on PH & Pc02 values    pCO2, Arterial: 103 mmHg    pO2, Arterial: 393 mmHg    HCO3, Arterial: 31 mmol/L    Base Excess, Arterial: -2.4 mmol/L    Oxygen Saturation, Arterial: 100 %    Blood Gas Arterial - Calcium, Ionized: 1.31 mmol/L (12.18.18 @ 13:09)  Blood Gas Arterial - Potassium: 4.4 mmol/L (12.18.18 @ 13:09)  Blood Gas Arterial - Sodium: 136 mmol/L (12.18.18 @ 13:09)  Blood Gas Profile - Arterial (..18 @ 13:09)      pH, Arterial: 7.00: Critical blood gas results notified and read back by Jayne Brock @  18 13:17    pCO2, Arterial: 124: Critical blood gas results notified and read back by Jayne Brock @  18 13:17 mmHg    pO2, Arterial: 276 mmHg    HCO3, Arterial: 30 mmol/L    Base Excess, Arterial: -5.7 mmol/L    Oxygen Saturation, Arterial: 99 %    Serum Pro-Brain Natriuretic Peptide: 31776 g/mL (18.18 @ 12:39)    Troponin T, Serum: 0.04g/mL (12.18.18 @ 12:39)  Creatine Kinase, Serum: see note: markedly hemolyzed U/L (12.18.18 @ 12:39)    D-Dimer Assay, Quantitative: 162 g/mL DDU (12.18.18 @ 12:38)    POCT Blood Glucose.: 344: NotifiedMDClndMtr mg/dL (12.18.18 @ 11:54)    Imaging:  Pending    PEx:  T(C): 37.1 (18-18 @ 13:34), Max: 37.1 (18 @ 12:42)  HR: 84 (-18 @ 13:34) (0 - 84)  BP: 94/63 (18 @ 13:34) (0/0 - 94/63)  RR: 14 (18-18 @ 13:34) (0 - 14)  SpO2: 100% (18 @ 12:40) (100% - 100%)  Wt(kg): 80    General: Elderly overweight man intubated sedated unresponsive to painful stimulus.   HEENT: Fixed gaze Pupils sluggish Bleeding from nose and mouth ETT+  Neck: Supple   CVS: RR S1S2 Distant heart sounds  Resp: Mechanically vented Not overbrething the vent Occasional changes in rate due to myoclonus  GI: Globose Soft   :  Normal  Musc:   Myoclonus  No C/C  LLE iO line  Neuro: Sedated   Psych: Unresponsive   Skin: Non juandiced  Lymph: Edema -  Preadmit Karnofsky: 80%       Current Karnofsky:  10%  Cachexia (Y/N): No  BMI: 26    Advanced Directives:     Full Code    Decision maker: The patient does not have capacity for complex medical decision making given medical condition.  Legal surrogate: No documented HCP or living will. Patient's wife Jenniffer Wei [phone]    GOALS OF CARE DISCUSSION       Palliative care info/counseling provided	           Family meeting at bedside.       Advanced Directives addressed please see Advance Care Planning Note	           Documentation of GOC: Continue current level of care. Wife deciding if will forego any further resuscitation attempts given poor prognosis.           REFERRALS	        Unit SW/Case Mgmt        - Following BRIAN WEI          MRN-[id number]            (1955)    HPI:  This is a 64 yo man with history of asthma, T2DM, and HTN who presents in cardiac arrest. Patient was found outside hospital in a taxi by ED nurse and CPR was initiated on the street. In ED, patient was in asystole, ACLS protocol began, and patient was intubated- patient received 4x epi with ROSC at 12:12. Patient received Keppra 1g, Ativan 1mg, 2L NS, started on versed, propofol, and vasopressor ggt. (18 Dec 2018 14:22)    PAST MEDICAL & SURGICAL HISTORY:  DM  Bronchitis  Asthma  Heart disease    FAMILY HISTORY:  Brother  at 51 yo from heart disease  Parents  (Both over 81 yo)    SOCIAL HISTORY: Syriac speaking Originally from [facility] Here in the  for 30years. Remarried 20 years. Has adult children from prior marriage. All other family in [facility]. Was living with wife in [facility] apartment. [facility]    ROS:    Unable to attain due to:  Medical condition                    Dyspnea (Jose 0-10):  7 Mechanically vented                      N/V (Y/N): Unable to assess                             Secretions (Y/N) :  Yes              Agitation(Y/N): No  Pain (Y/N): MANDEEP pain scale: 4      -Provocation/Palliation: Unable to assess       -Quality/Quantity: Unable to assess       -Radiating: Unable to assess       -Severity: Mild  -Timing/Frequency: Unable to assess       -Impact on ADLs: Unable to assess         Allergies: No Known Allergies or Intolerances    Opiate Naive (Y/N): Yes  -iStop reviewed (Y/N): Yes. No Rx found on iStop review (Ref#:[id number])    Medications:      MEDICATIONS  (STANDING):  ALBUTerol/ipratropium for Nebulization 3 milliLiter(s) Nebulizer every 4 hours  heparin  Infusion 1400 Unit(s)/Hr (14 mL/Hr) IV Continuous <Continuous>  propofol Infusion 5 MICROgram(s)/kG/Min (2.4 mL/Hr) IV Continuous <Continuous>  sodium chloride 0.9%. 1000 milliLiter(s) (125 mL/Hr) IV Continuous <Continuous>  vasopressin Infusion 0.04 Unit(s)/Min (2.4 mL/Hr) IV Continuous <Continuous>    Labs:    CBC:                        16.2   13.1  )-----------( 169      ( 18 Dec 2018 12:38 )             52.6     CMP:        142  |  98  |  35<H>  ----------------------------<  218<H>  4.6   |  29  |  1.63<H>    Ca    8.7      18 Dec 2018 14:16    TPro  5.4<L>  /  Alb  3.0<L>  /  TBili  0.4  /  DBili  x   /  AST  354<H>  /  ALT  258<H>  /  AlkPhos  89      PT/INR - ( 18 Dec 2018 12:38 )   PT: 12.1 sec;   INR: 1.07     PTT - ( 18 Dec 2018 12:38 )  PTT:33.4 sec    Lactate, Blood: 4.6 moL/L (.18 @ 14:16)    Blood Gas Profile - Arterial (18 @ 14:15)    pH, Arterial: 7.10: 18 14:23  Abilio carty  notified  & read back on PH & Pc02 values    pCO2, Arterial: 103 mmHg    pO2, Arterial: 393 mmHg    HCO3, Arterial: 31 mmol/L    Base Excess, Arterial: -2.4 mmol/L    Oxygen Saturation, Arterial: 100 %    Blood Gas Arterial - Calcium, Ionized: 1.31 mmol/L (12.18.18 @ 13:09)  Blood Gas Arterial - Potassium: 4.4 mmol/L (12.18.18 @ 13:09)  Blood Gas Arterial - Sodium: 136 mmol/L (12.18.18 @ 13:09)  Blood Gas Profile - Arterial (..18 @ 13:09)      pH, Arterial: 7.00: Critical blood gas results notified and read back by Jayne Brock @  18 13:17    pCO2, Arterial: 124: Critical blood gas results notified and read back by Jayne Brock @  18 13:17 mmHg    pO2, Arterial: 276 mmHg    HCO3, Arterial: 30 mmol/L    Base Excess, Arterial: -5.7 mmol/L    Oxygen Saturation, Arterial: 99 %    Serum Pro-Brain Natriuretic Peptide: 20460 g/mL (18.18 @ 12:39)    Troponin T, Serum: 0.04g/mL (12.18.18 @ 12:39)  Creatine Kinase, Serum: see note: markedly hemolyzed U/L (12.18.18 @ 12:39)    D-Dimer Assay, Quantitative: 162 g/mL DDU (12.18.18 @ 12:38)    POCT Blood Glucose.: 344: NotifiedMDClndMtr mg/dL (12.18.18 @ 11:54)    Imaging:  Pending    PEx:  T(C): 37.1 (18-18 @ 13:34), Max: 37.1 (18 @ 12:42)  HR: 84 (-18 @ 13:34) (0 - 84)  BP: 94/63 (-18 @ 13:34) (0/0 - 94/63)  RR: 14 (18-18 @ 13:34) (0 - 14)  SpO2: 100% (18 @ 12:40) (100% - 100%)  Wt(kg): 80    General: Elderly overweight man intubated sedated unresponsive to painful stimulus.   HEENT: Fixed gaze Pupils sluggish Bleeding from nose and mouth ETT+  Neck: Supple   CVS: RR S1S2 Distant heart sounds  Resp: Mechanically vented Not overbrething the vent Occasional changes in rate due to myoclonus  GI: Globose Soft   :  Normal  Musc:   Myoclonus  No C/C  LLE iO line  Neuro: Sedated   Psych: Unresponsive   Skin: Non juandiced  Lymph: Edema -  Preadmit Karnofsky: 80%       Current Karnofsky:  10%  Cachexia (Y/N): No  BMI: 26    Advanced Directives:     DNR    Decision maker: The patient does not have capacity for complex medical decision making given medical condition.  Legal surrogate: No documented HCP or living will. Patient's wife Jenniffer Wei [phone]    GOALS OF CARE DISCUSSION       Palliative care info/counseling provided	           Family meeting at bedside.       Advanced Directives addressed please see Advance Care Planning Note	           Documentation of GOC: DNR, but continue current level of care ie pressors, intubation, and abx. Wife decided on not have the patient resuscitated again if he passes.           REFERRALS	        Unit SW/Case Mgmt        - Following

## 2018-12-18 NOTE — CONSULT NOTE ADULT - PROBLEM SELECTOR RECOMMENDATION 4
In addition to the EM visit, an advance care planning meeting was performed  Start time: 2:25pm  End time: 3:15pm  Total time: 50min  A face to face meeting to discuss advance care planning was held today regarding: BRIAN CHERRY  Primary decision maker: Wife Jenniffer Eriberto [phone]  Discussed advance directives including, but not limited to, healthcare proxy and code status.  Decision regarding code status: PENDING  Documentation completed today: PENDING  TBD DNR

## 2018-12-18 NOTE — CONSULT NOTE ADULT - SUBJECTIVE AND OBJECTIVE BOX
HPI:  Mr. Wei is a 63 year old man with a past medical history significant for asthma/COPD, diabetes mellitus, hypertension who presented with an asystolic cardiac arrest. Patient is intubated, history obtained from wife. She reports he has been short of breath and has been unable to sleep for the last two nights, no improvement with inhalers.     The patient's wife  This is a 62 yo man with history of asthma, T2DM, and HTN who presents in cardiac arrest. Patient was found outside hospital in a taxi by ED nurse and CPR was initiated on the street.     In ED, patient was in asystole, ACLS protocol began, and patient was intubated- patient received 4x epi with ROSC at 12:12. Patient received Keppra 1g, Ativan 1mg, 2L NS, started on versed, propofol, and vasopressor ggt. (18 Dec 2018 14:22)      PAST MEDICAL & SURGICAL HISTORY:      SOCIAL HISTORY:  - Smoking:  - Alcohol:  - Recreational drug use:  - Other:    FAMILY HISTORY:      Allergies    No Known Allergies    Intolerances        MEDICATIONS  (STANDING):  ALBUTerol/ipratropium for Nebulization 3 milliLiter(s) Nebulizer every 4 hours  chlorhexidine 0.12% Liquid 15 milliLiter(s) Oral Mucosa every 12 hours  chlorhexidine 2% Cloths 1 Application(s) Topical <User Schedule>  heparin  Infusion 1400 Unit(s)/Hr (14 mL/Hr) IV Continuous <Continuous>  norepinephrine Infusion 0.05 MICROgram(s)/kG/Min (3.75 mL/Hr) IV Continuous <Continuous>  propofol Infusion 5 MICROgram(s)/kG/Min (2.4 mL/Hr) IV Continuous <Continuous>  sodium chloride 0.9%. 1000 milliLiter(s) (125 mL/Hr) IV Continuous <Continuous>  vasopressin Infusion 0.04 Unit(s)/Min (2.4 mL/Hr) IV Continuous <Continuous>    MEDICATIONS  (PRN):      REVIEW OF SYSTEMS:  12 point ROS negative except for that stated in the HPI    PHYSICAL EXAM:  Vitals past 24 Hours: T(C): 36.4 (18 @ 15:15), Max: 37.1 (18 @ 12:42)  HR: 72 (18 @ 16:30) (0 - 84)  BP: 149/68 (18 @ 16:15) (0/0 - 162/75)  RR: 20 (18 @ 16:30) (0 - 30)  SpO2: 92% (18 @ 16:40) (92% - 100%)	    Daily     Daily Weight in k.1 (18 Dec 2018 15:15)    GEN: Alert and awake, no acute distress  HEENT: Moist mucous membranes  Neck: No JVD  Cardiovascular: Regular rate and rhythm, +S1 S2. No murmurs, rubs, or gallops appreciated  Respiratory: Lungs clear to auscultation bilaterally  Gastrointestinal:  Soft, non-tender, non-distended, normoactive bowel sounds  Skin: No rashes, No ecchymoses, No cyanosis  Neurologic: Non-focal, alert and oriented x3.   Extremities: No clubbing, cyanosis or edema. Warm, well-perfused extremities.   Vascular: Radial and dorsalis pedis pulses 2+ bilaterally    I&O's Detail        LABS:                        16.4   17.4  )-----------( 125      ( 18 Dec 2018 16:47 )             52.9     1218    142  |  98  |  35<H>  ----------------------------<  218<H>  4.6   |  29  |  1.63<H>    Ca    8.7      18 Dec 2018 14:16    TPro  5.4<L>  /  Alb  3.0<L>  /  TBili  0.4  /  DBili  x   /  AST  354<H>  /  ALT  258<H>  /  AlkPhos  89  1218    CARDIAC MARKERS ( 18 Dec 2018 12:39 )  x     / 0.04 ng/mL / see note U/L / x     / x          PT/INR - ( 18 Dec 2018 12:38 )   PT: 12.1 sec;   INR: 1.07          PTT - ( 18 Dec 2018 12:38 )  PTT:33.4 sec    I&O's Summary      CARDIAC DIAGNOSTIC TESTING:    ECG:    TELEMETRY:    ECHO:      RADIOLOGY & ADDITIONAL STUDIES:      ASSESSMENT/PLAN: HPI:  Mr. Wei is a 63 year old man with a past medical history significant for asthma/COPD, diabetes mellitus, hypertension who presented with an asystolic cardiac arrest. Patient is intubated, history obtained from wife. She reports he has been short of breath and has been unable to sleep for the last two nights, no improvement with inhalers. He has also been complaining of generalized weakness and lower extremity swelling for the last two weeks. He presented to his pulmonologist's office today for evaluation where he was found to be short of breath and hypoxic who recommended going to the emergency room. The patient and his wife took a cab to [facility] and upon arrival the patient was found to be pulseless. He was found to be asystolic, received two doses of epi, was defibrillated once out of concern for ventricular tachycardia (which was likely sinus tachycardia with underlying RBBB), and ROSC was achieved after 10-15 minutes. Subsequently in the ED he had an asystolic cardiac arrest two more times with ROSC after 2 rounds of CPR. Cardiology was consulted for evaluation for the CCU.     PAST MEDICAL & SURGICAL HISTORY:  As per HPI    SOCIAL HISTORY:  Active tobacco use    FAMILY HISTORY:  Brother:  at the age of 50 from heart disease     Allergies    No Known Allergies    Intolerances        MEDICATIONS  (STANDING):  ALBUTerol/ipratropium for Nebulization 3 milliLiter(s) Nebulizer every 4 hours  chlorhexidine 0.12% Liquid 15 milliLiter(s) Oral Mucosa every 12 hours  chlorhexidine 2% Cloths 1 Application(s) Topical <User Schedule>  heparin  Infusion 1400 Unit(s)/Hr (14 mL/Hr) IV Continuous <Continuous>  norepinephrine Infusion 0.05 MICROgram(s)/kG/Min (3.75 mL/Hr) IV Continuous <Continuous>  propofol Infusion 5 MICROgram(s)/kG/Min (2.4 mL/Hr) IV Continuous <Continuous>  sodium chloride 0.9%. 1000 milliLiter(s) (125 mL/Hr) IV Continuous <Continuous>  vasopressin Infusion 0.04 Unit(s)/Min (2.4 mL/Hr) IV Continuous <Continuous>      REVIEW OF SYSTEMS:  12 point ROS negative except for that stated in the HPI    PHYSICAL EXAM:  Vitals past 24 Hours: T(C): 36.4 (18 @ 15:15), Max: 37.1 (1218-18 @ 12:42)  HR: 72 (18 @ 16:30) (0 - 84)  BP: 149/68 (18 @ 16:15) (0/0 - 162/75)  RR: 20 (18 @ 16:30) (0 - 30)  SpO2: 92% (18 @ 16:40) (92% - 100%)	    Daily Weight in k.1 (18 Dec 2018 15:15)    GEN: Unresponsive, intubated  Neck: No JVD  Cardiovascular: Regular rate and rhythm, +S1 S2. No murmurs, rubs, or gallops appreciated  Respiratory: Good airway entry bilaterally  Gastrointestinal:  Soft, non-tender, non-distended  Neurologic: Unresponsive   Extremities: Warm, well-perfused extremities. 1+ edema bilaterally   Vascular: Radial pulses 2+ bilaterally    LABS:                        16.4   17.4  )-----------( 125      ( 18 Dec 2018 16:47 )             52.9         142  |  98  |  35<H>  ----------------------------<  218<H>  4.6   |  29  |  1.63<H>    Ca    8.7      18 Dec 2018 14:16    TPro  5.4<L>  /  Alb  3.0<L>  /  TBili  0.4  /  DBili  x   /  AST  354<H>  /  ALT  258<H>  /  AlkPhos  89  12-18    CARDIAC MARKERS ( 18 Dec 2018 12:39 )  x     / 0.04 ng/mL / see note U/L / x     / x          PT/INR - ( 18 Dec 2018 12:38 )   PT: 12.1 sec;   INR: 1.07          PTT - ( 18 Dec 2018 12:38 )  PTT:33.4 sec    I&O's Summary      CARDIAC DIAGNOSTIC TESTING:    ECG: NSR. RBBB. Non-specific ST-T wave changes laterally      ECHO:  Interpretation Summary  Limited poor quality study. The left atrial size is normal. Right atrium   not well visualized.The aortic valve is not well visualized. No aortic  regurgitation noted.Structurally normal mitral valve. No mitral   regurgitation noted.Structurally normal tricuspid valve. There is mild tricuspid  regurgitation.The pulmonary artery systolic pressure is estimated to be   45 mmHg.The pulmonic valve is not well visualized.The right ventricle is   not well visualized. RV is probably dilated with reduced function. [place] appears  hyperkinetic suggestive of right heart strain.   There is mild   concentric left ventricular hypertrophy. Grossly normal LV function.  No aortic root  dilatation.There is no pericardial effusion.Results were communicated to   the team.      RADIOLOGY & ADDITIONAL STUDIES:  CXR: Right sided pneumothorax    ASSESSMENT/PLAN:  Mr. Wei is a 63 year old man with a past medical history significant for asthma/COPD, diabetes mellitus, hypertension who presented with an asystolic cardiac arrest. His EKG after resuscitation shows sinus rhythm with a RBBB with no acute ischemic changes (no prior EKG available for comparison). Echocardiogram was of poor quality but grossly showed intact LV systolic function, dilated RV and likely reduced RV function and RV strain. These findings are concerning for possible pulmonary embolus as the etiology of his cardiac arrest vs. other pulmonary process including COPD. Empiric treatment for possible pulmonary embolus was recommended to the ED as the patient was too unstable for a CT scan. Right sided pneumothorax was found on CXR however this is likely iatrogenic rather than the cause of his cardiac arrest. Chest tube placed by CT surgery. Acute coronary syndrome is less likely, but recommend trending troponins and EKGs to rule out ACS.   - Trend troponins  - Will need repeat echocardiogram likely with definity contrast for better evaluation   - Heparin drip   - Hold off on dual antiplatelet therapy as suspicion for ACS is low   - Recommend MICU consultation    Thank you for this consult.     Case discussed with Dr. Evans Houser  Cardiology Fellow HPI:  Mr. Wei is a 63 year old man with a past medical history significant for asthma/COPD, diabetes mellitus, hypertension who presented with an asystolic cardiac arrest. Patient is intubated, history obtained from wife. She reports he has been short of breath and has been unable to sleep for the last two nights, no improvement with inhalers. He has also been complaining of generalized weakness and lower extremity swelling for the last two weeks. He presented to his pulmonologist's office today for evaluation where he was found to be short of breath and hypoxic who recommended going to the emergency room. The patient and his wife took a cab to [facility] and upon arrival the patient was found to be pulseless. He was found to be asystolic, received two doses of epi, was defibrillated once out of concern for ventricular tachycardia (which was likely sinus tachycardia with underlying RBBB), and ROSC was achieved after 10-15 minutes. Subsequently in the ED he had an asystolic cardiac arrest two more times with ROSC after 2 rounds of CPR. Cardiology was consulted for evaluation for the CCU.     PAST MEDICAL & SURGICAL HISTORY:  As per HPI    SOCIAL HISTORY:  Active tobacco use    FAMILY HISTORY:  Brother:  at the age of 50 from heart disease     Allergies    No Known Allergies    Intolerances        MEDICATIONS  (STANDING):  ALBUTerol/ipratropium for Nebulization 3 milliLiter(s) Nebulizer every 4 hours  chlorhexidine 0.12% Liquid 15 milliLiter(s) Oral Mucosa every 12 hours  chlorhexidine 2% Cloths 1 Application(s) Topical <User Schedule>  heparin  Infusion 1400 Unit(s)/Hr (14 mL/Hr) IV Continuous <Continuous>  norepinephrine Infusion 0.05 MICROgram(s)/kG/Min (3.75 mL/Hr) IV Continuous <Continuous>  propofol Infusion 5 MICROgram(s)/kG/Min (2.4 mL/Hr) IV Continuous <Continuous>  sodium chloride 0.9%. 1000 milliLiter(s) (125 mL/Hr) IV Continuous <Continuous>  vasopressin Infusion 0.04 Unit(s)/Min (2.4 mL/Hr) IV Continuous <Continuous>      REVIEW OF SYSTEMS:  12 point ROS negative except for that stated in the HPI    PHYSICAL EXAM:  Vitals past 24 Hours: T(C): 36.4 (18 @ 15:15), Max: 37.1 (1218-18 @ 12:42)  HR: 72 (18 @ 16:30) (0 - 84)  BP: 149/68 (18 @ 16:15) (0/0 - 162/75)  RR: 20 (18 @ 16:30) (0 - 30)  SpO2: 92% (18 @ 16:40) (92% - 100%)	    Daily Weight in k.1 (18 Dec 2018 15:15)    GEN: Unresponsive, intubated  Neck: No JVD  Cardiovascular: Regular rate and rhythm, +S1 S2. No murmurs, rubs, or gallops appreciated  Respiratory: Good airway entry bilaterally  Gastrointestinal:  Soft, non-tender, non-distended  Neurologic: Unresponsive   Extremities: Warm, well-perfused extremities. 1+ edema bilaterally   Vascular: Radial pulses 2+ bilaterally    LABS:                        16.4   17.4  )-----------( 125      ( 18 Dec 2018 16:47 )             52.9         142  |  98  |  35<H>  ----------------------------<  218<H>  4.6   |  29  |  1.63<H>    Ca    8.7      18 Dec 2018 14:16    TPro  5.4<L>  /  Alb  3.0<L>  /  TBili  0.4  /  DBili  x   /  AST  354<H>  /  ALT  258<H>  /  AlkPhos  89  12-18    CARDIAC MARKERS ( 18 Dec 2018 12:39 )  x     / 0.04 ng/mL / see note U/L / x     / x          PT/INR - ( 18 Dec 2018 12:38 )   PT: 12.1 sec;   INR: 1.07          PTT - ( 18 Dec 2018 12:38 )  PTT:33.4 sec    I&O's Summary      CARDIAC DIAGNOSTIC TESTING:    ECG: NSR. RBBB. Non-specific ST-T wave changes laterally      ECHO:  Interpretation Summary  Limited poor quality study. The left atrial size is normal. Right atrium   not well visualized.The aortic valve is not well visualized. No aortic  regurgitation noted.Structurally normal mitral valve. No mitral   regurgitation noted.Structurally normal tricuspid valve. There is mild tricuspid  regurgitation.The pulmonary artery systolic pressure is estimated to be   45 mmHg.The pulmonic valve is not well visualized.The right ventricle is   not well visualized. RV is probably dilated with reduced function. [place] appears  hyperkinetic suggestive of right heart strain.   There is mild   concentric left ventricular hypertrophy. Grossly normal LV function.  No aortic root  dilatation.There is no pericardial effusion.Results were communicated to   the team.      RADIOLOGY & ADDITIONAL STUDIES:  CXR: Right sided pneumothorax    ASSESSMENT/PLAN:  Mr. Wei is a 63 year old man with a past medical history significant for asthma/COPD, diabetes mellitus, hypertension who presented with an asystolic cardiac arrest. His EKG after resuscitation shows sinus rhythm with a RBBB with no acute ischemic changes (no prior EKG available for comparison). Echocardiogram was of poor quality but grossly showed intact LV systolic function, dilated RV and likely reduced RV function and RV strain. These findings are concerning for possible pulmonary embolus as the etiology of his cardiac arrest vs. other pulmonary process including COPD. Empiric treatment for possible pulmonary embolus was recommended to the ED as the patient was too unstable for a CT scan. Right sided pneumothorax was found on CXR however this is likely iatrogenic rather than the cause of his cardiac arrest. Chest tube placed by CT surgery. Acute coronary syndrome is less likely, but recommend trending troponins and EKGs to rule out ACS.   - Trend troponins until they plateau  - Will need repeat echocardiogram likely with definity contrast for better evaluation   - Heparin drip   - Hold off on dual antiplatelet therapy as suspicion for ACS is low   - Recommend MICU consultation    Thank you for this consult.     Case discussed with Dr. Evans Houser  Cardiology Fellow

## 2018-12-18 NOTE — ED ADULT TRIAGE NOTE - OTHER COMPLAINTS
per ems, flagged down by ED RN. pt found outside the hospital in a taxi in cardiac arrest. cpr initiated on the street. wife at bedside. per wife, pt "wasn't feeling well today." hx asthma, dm, htn.

## 2018-12-18 NOTE — ED ADULT NURSE REASSESSMENT NOTE - NS ED NURSE REASSESS COMMENT FT1
Assumed patient care at 1235pm. Case mgmt with spouse of pt, chaplain master Dr Director updated family regarding plan of care. Cardiothoracic surgery at the bedside at this time to place R sided chest tube. TPA was not initiated. Propofol initiated at 1322 for sedation.

## 2018-12-18 NOTE — H&P ADULT - NSHPPHYSICALEXAM_GEN_ALL_CORE
.  VITAL SIGNS:  T(C): 36.4 (12-18-18 @ 15:15), Max: 37.1 (12-18-18 @ 12:42)  T(F): 97.6 (12-18-18 @ 15:15), Max: 98.7 (12-18-18 @ 12:42)  HR: 64 (12-18-18 @ 17:00) (0 - 84)  BP: 98/57 (12-18-18 @ 17:00) (0/0 - 162/75)  BP(mean): 72 (12-18-18 @ 17:00) (72 - 109)  RR: 16 (12-18-18 @ 17:00) (0 - 30)  SpO2: 92% (12-18-18 @ 17:00) (92% - 100%)  Wt(kg): --    PHYSICAL EXAM:    General: NAD, intubated and sedated  HEENT: NCAT, pupils equal, round, minimally reactive to light  Neck: supple, no JVD  Respiratory: decreased breath sounds likely due to body habitus, but CTA b/l, no wheezing auscultated  Cardiovascular: RRR, normal S1S2, no M/R/G  Abdomen: soft, NT/ND, bowel sounds in all four quadrants, no palpable masses  Extremities: warm, b/l pitting edema, IO in LLE  Neuro: AOx0, with intermittent upper extremity jerks, not withdrawing to pain

## 2018-12-18 NOTE — CONSULT NOTE ADULT - SUBJECTIVE AND OBJECTIVE BOX
MICU CONSULT NOTE    HPI: History obtained from wife at bedside. Patient is a 63m w/hx of asthma, 62 yo man with history of asthma, T2DM, and HTN who presents in cardiac arrest. Patient was found outside hospital in a taxi by ED nurse and CPR was initiated on the street.     In ED, patient was in asystole, ACLS protocol began, and patient was intubated- patient received 4x epi with ROSC at 12:12. Patient received Keppra 1g, Ativan 1mg, 2L NS, started on versed, propofol, and vasopressor ggt. (18 Dec 2018 14:22)      Allergies    No Known Allergies    Intolerances        MEDICATIONS  (STANDING):  ALBUTerol/ipratropium for Nebulization 3 milliLiter(s) Nebulizer every 4 hours  heparin  Infusion 1400 Unit(s)/Hr (14 mL/Hr) IV Continuous <Continuous>  propofol Infusion 5 MICROgram(s)/kG/Min (2.4 mL/Hr) IV Continuous <Continuous>  sodium chloride 0.9%. 1000 milliLiter(s) (125 mL/Hr) IV Continuous <Continuous>  vasopressin Infusion 0.04 Unit(s)/Min (2.4 mL/Hr) IV Continuous <Continuous>    MEDICATIONS  (PRN):      Drug Dosing Weight    Weight (kg): 80 (18 Dec 2018 13:32)    PAST MEDICAL & SURGICAL HISTORY:      FAMILY HISTORY:      SOCIAL HISTORY:    ADVANCE DIRECTIVES:    REVIEW OF SYSTEMS:    CONSTITUTIONAL: No weakness, fevers or chills  EYES/ENT: No visual changes;  No vertigo or throat pain   NECK: No pain or stiffness  RESPIRATORY: No cough, wheezing, hemoptysis; No shortness of breath  CARDIOVASCULAR: No chest pain or palpitations  GASTROINTESTINAL: No abdominal or epigastric pain. No nausea, vomiting, or hematemesis; No diarrhea or constipation. No melena or hematochezia.  GENITOURINARY: No dysuria, frequency or hematuria  NEUROLOGICAL: No numbness or weakness  SKIN: No itching, burning, rashes, or lesions   All other review of systems is negative unless indicated above.        ICU Vital Signs Last 24 Hrs  T(C): 37.1 (18 Dec 2018 13:34), Max: 37.1 (18 Dec 2018 12:42)  T(F): 98.7 (18 Dec 2018 13:34), Max: 98.7 (18 Dec 2018 12:42)  HR: 84 (18 Dec 2018 13:34) (0 - 84)  BP: 94/63 (18 Dec 2018 13:34) (0/0 - 94/63)  BP(mean): --  ABP: --  ABP(mean): --  RR: 14 (18 Dec 2018 13:34) (0 - 14)  SpO2: 100% (18 Dec 2018 12:40) (100% - 100%)      ABG - ( 18 Dec 2018 14:15 )  pH, Arterial: 7.10  pH, Blood: x     /  pCO2: 103   /  pO2: 393   / HCO3: 31    / Base Excess: -2.4  /  SaO2: 100                 I&O's Detail      PHYSICAL EXAM:    Constitutional: WDWN resting comfortably in bed; NAD  Head: NC/AT  Eyes: PERRL, EOMI, clear conjunctiva  ENT: no nasal discharge; uvula midline, no oropharyngeal erythema or exudates; MMM  Neck: supple; no JVD or thyromegaly  Respiratory: CTA B/L; no W/R/R, no retractions  Cardiac: +S1/S2; RRR; no M/R/G; PMI non-displaced  Gastrointestinal: soft, NT/ND; no rebound or guarding; +BSx4  Genitourinary: normal external genitalia  Back: spine midline, no bony tenderness or step-offs; no CVAT B/L  Extremities: WWP, no clubbing or cyanosis; no peripheral edema  Musculoskeletal: NROM x4; no joint swelling, tenderness or erythema  Vascular: 2+ radial, femoral, DP/PT pulses B/L  Dermatologic: skin warm, dry and intact; no rashes, wounds, or scars  Lymphatic: no submandibular or cervical LAD  Neurologic: AAOx3; CNII-XII grossly intact; no focal deficits  Psychiatric: affect and characteristics of appearance, verbalizations, behaviors are appropriate    LABS:  CBC Full  -  ( 18 Dec 2018 12:38 )  WBC Count : 13.1 K/uL  Hemoglobin : 16.2 g/dL  Hematocrit : 52.6 %  Platelet Count - Automated : 169 K/uL  Mean Cell Volume : 97.6 fL  Mean Cell Hemoglobin : 30.1 pg  Mean Cell Hemoglobin Concentration : 30.8 g/dL  Auto Neutrophil # : x  Auto Lymphocyte # : x  Auto Monocyte # : x  Auto Eosinophil # : x  Auto Basophil # : x  Auto Neutrophil % : x  Auto Lymphocyte % : x  Auto Monocyte % : x  Auto Eosinophil % : x  Auto Basophil % : x    12-18    142  |  98  |  35<H>  ----------------------------<  218<H>  4.6   |  29  |  1.63<H>    Ca    8.7      18 Dec 2018 14:16    TPro  5.4<L>  /  Alb  3.0<L>  /  TBili  0.4  /  DBili  x   /  AST  354<H>  /  ALT  258<H>  /  AlkPhos  89  12-18    CAPILLARY BLOOD GLUCOSE      POCT Blood Glucose.: 344 mg/dL (18 Dec 2018 11:54)    PT/INR - ( 18 Dec 2018 12:38 )   PT: 12.1 sec;   INR: 1.07          PTT - ( 18 Dec 2018 12:38 )  PTT:33.4 sec      EKG:    ECHO, US:    RADIOLOGY:    CRITICAL CARE TIME SPENT: MICU CONSULT NOTE    HPI: History obtained from wife at bedside. Patient is a 63m w/hx of asthma, DM2, & HTN who presented to [facility] ED 64 yo man with history of asthma, T2DM, and HTN who presents in cardiac arrest. Patient was found outside hospital in a taxi by ED nurse and CPR was initiated on the street.     In ED, patient was in asystole, ACLS protocol began, and patient was intubated- patient received 4x epi with ROSC at 12:12. Patient received Keppra 1g, Ativan 1mg, 2L NS, started on versed, propofol, and vasopressor ggt. (18 Dec 2018 14:22)      Allergies    No Known Allergies    Intolerances        MEDICATIONS  (STANDING):  ALBUTerol/ipratropium for Nebulization 3 milliLiter(s) Nebulizer every 4 hours  heparin  Infusion 1400 Unit(s)/Hr (14 mL/Hr) IV Continuous <Continuous>  propofol Infusion 5 MICROgram(s)/kG/Min (2.4 mL/Hr) IV Continuous <Continuous>  sodium chloride 0.9%. 1000 milliLiter(s) (125 mL/Hr) IV Continuous <Continuous>  vasopressin Infusion 0.04 Unit(s)/Min (2.4 mL/Hr) IV Continuous <Continuous>    MEDICATIONS  (PRN):      Drug Dosing Weight    Weight (kg): 80 (18 Dec 2018 13:32)    PAST MEDICAL & SURGICAL HISTORY:      FAMILY HISTORY:      SOCIAL HISTORY:    ADVANCE DIRECTIVES:    REVIEW OF SYSTEMS:    CONSTITUTIONAL: No weakness, fevers or chills  EYES/ENT: No visual changes;  No vertigo or throat pain   NECK: No pain or stiffness  RESPIRATORY: No cough, wheezing, hemoptysis; No shortness of breath  CARDIOVASCULAR: No chest pain or palpitations  GASTROINTESTINAL: No abdominal or epigastric pain. No nausea, vomiting, or hematemesis; No diarrhea or constipation. No melena or hematochezia.  GENITOURINARY: No dysuria, frequency or hematuria  NEUROLOGICAL: No numbness or weakness  SKIN: No itching, burning, rashes, or lesions   All other review of systems is negative unless indicated above.        ICU Vital Signs Last 24 Hrs  T(C): 37.1 (18 Dec 2018 13:34), Max: 37.1 (18 Dec 2018 12:42)  T(F): 98.7 (18 Dec 2018 13:34), Max: 98.7 (18 Dec 2018 12:42)  HR: 84 (18 Dec 2018 13:34) (0 - 84)  BP: 94/63 (18 Dec 2018 13:34) (0/0 - 94/63)  BP(mean): --  ABP: --  ABP(mean): --  RR: 14 (18 Dec 2018 13:34) (0 - 14)  SpO2: 100% (18 Dec 2018 12:40) (100% - 100%)      ABG - ( 18 Dec 2018 14:15 )  pH, Arterial: 7.10  pH, Blood: x     /  pCO2: 103   /  pO2: 393   / HCO3: 31    / Base Excess: -2.4  /  SaO2: 100                 I&O's Detail      PHYSICAL EXAM:    Constitutional: WDWN resting comfortably in bed; NAD  Head: NC/AT  Eyes: PERRL, EOMI, clear conjunctiva  ENT: no nasal discharge; uvula midline, no oropharyngeal erythema or exudates; MMM  Neck: supple; no JVD or thyromegaly  Respiratory: CTA B/L; no W/R/R, no retractions  Cardiac: +S1/S2; RRR; no M/R/G; PMI non-displaced  Gastrointestinal: soft, NT/ND; no rebound or guarding; +BSx4  Genitourinary: normal external genitalia  Back: spine midline, no bony tenderness or step-offs; no CVAT B/L  Extremities: WWP, no clubbing or cyanosis; no peripheral edema  Musculoskeletal: NROM x4; no joint swelling, tenderness or erythema  Vascular: 2+ radial, femoral, DP/PT pulses B/L  Dermatologic: skin warm, dry and intact; no rashes, wounds, or scars  Lymphatic: no submandibular or cervical LAD  Neurologic: AAOx3; CNII-XII grossly intact; no focal deficits  Psychiatric: affect and characteristics of appearance, verbalizations, behaviors are appropriate    LABS:  CBC Full  -  ( 18 Dec 2018 12:38 )  WBC Count : 13.1 K/uL  Hemoglobin : 16.2 g/dL  Hematocrit : 52.6 %  Platelet Count - Automated : 169 K/uL  Mean Cell Volume : 97.6 fL  Mean Cell Hemoglobin : 30.1 pg  Mean Cell Hemoglobin Concentration : 30.8 g/dL  Auto Neutrophil # : x  Auto Lymphocyte # : x  Auto Monocyte # : x  Auto Eosinophil # : x  Auto Basophil # : x  Auto Neutrophil % : x  Auto Lymphocyte % : x  Auto Monocyte % : x  Auto Eosinophil % : x  Auto Basophil % : x    12-18    142  |  98  |  35<H>  ----------------------------<  218<H>  4.6   |  29  |  1.63<H>    Ca    8.7      18 Dec 2018 14:16    TPro  5.4<L>  /  Alb  3.0<L>  /  TBili  0.4  /  DBili  x   /  AST  354<H>  /  ALT  258<H>  /  AlkPhos  89  12-18    CAPILLARY BLOOD GLUCOSE      POCT Blood Glucose.: 344 mg/dL (18 Dec 2018 11:54)    PT/INR - ( 18 Dec 2018 12:38 )   PT: 12.1 sec;   INR: 1.07          PTT - ( 18 Dec 2018 12:38 )  PTT:33.4 sec      EKG:    ECHO, US:    RADIOLOGY:    CRITICAL CARE TIME SPENT: MICU CONSULT NOTE    HPI: History obtained from wife at bedside. Patient is a 63m w/hx of asthma, DM2, & HTN who presented to [facility] ED in cardiac arrest. Per the wife, patient has been feeling progressively weaker and unwell over the last few weeks, with the wife noting increased swelling in the patient's legs and SOB, worse over the last two days and not improved by inhaler use. The patient presented to his PMD's office today, who told the patient to present to the emergency room after evaluating him in the waiting area of the clinic. The patient drove home, parked his car, and took a taxi with his wife to [facility]. Per report, ED staff saw the  having difficulty helping the patient exit the cab outside of the ED and found the patient to be in cardiac arrest. CPR initiated on the street; presenting rhythm asystole. Patient arrived in the resuscitation room with ongoing compressions at 11:50, with ROSC ultimately obtained at 12:26. At one point, the rhythm was believed to be VTach, and one shock delivered. Patient intubated during code and started on levophed for pressor support. Found to have R pneumothorax on CXR; chest tube placed by CT Surgery. Given epi x4, bicarb, magnesium, solumedrol during code. Ordered for ativan 1mg IVP, keprra 1g IV, versed 5mg IVP, propofol, 2L NS bolus post code. Cardiology consulted in ED; low suspicion for ACS event upon review of EKG, bedside echo. Formal echocardiogram revealed dilated RV with decreased right sided function. Patient started on heparin gtt. MICU consulted for ICU placement.      Allergies    No Known Allergies    Intolerances        MEDICATIONS  (STANDING):  ALBUTerol/ipratropium for Nebulization 3 milliLiter(s) Nebulizer every 4 hours  heparin  Infusion 1400 Unit(s)/Hr (14 mL/Hr) IV Continuous <Continuous>  propofol Infusion 5 MICROgram(s)/kG/Min (2.4 mL/Hr) IV Continuous <Continuous>  sodium chloride 0.9%. 1000 milliLiter(s) (125 mL/Hr) IV Continuous <Continuous>  vasopressin Infusion 0.04 Unit(s)/Min (2.4 mL/Hr) IV Continuous <Continuous>    MEDICATIONS  (PRN):      Drug Dosing Weight    Weight (kg): 80 (18 Dec 2018 13:32)    PAST MEDICAL & SURGICAL HISTORY: DM2, HTN, asthma      FAMILY HISTORY: unable to obtain    SOCIAL HISTORY: unable to obtain    ADVANCE DIRECTIVES:    REVIEW OF SYSTEMS: unable to obtain    ICU Vital Signs Last 24 Hrs  T(C): 37.1 (18 Dec 2018 13:34), Max: 37.1 (18 Dec 2018 12:42)  T(F): 98.7 (18 Dec 2018 13:34), Max: 98.7 (18 Dec 2018 12:42)  HR: 84 (18 Dec 2018 13:34) (0 - 84)  BP: 94/63 (18 Dec 2018 13:34) (0/0 - 94/63)  BP(mean): --  ABP: --  ABP(mean): --  RR: 14 (18 Dec 2018 13:34) (0 - 14)  SpO2: 100% (18 Dec 2018 12:40) (100% - 100%)      ABG - ( 18 Dec 2018 14:15 )  pH, Arterial: 7.10  pH, Blood: x     /  pCO2: 103   /  pO2: 393   / HCO3: 31    / Base Excess: -2.4  /  SaO2: 100                 I&O's Detail      PHYSICAL EXAM: Note - patient examined prior to chest tube insertion    Constitutional: Intubated with periodic myoclonic jerks  Head: NC/AT  Eyes: Eyes open, scleral injection, 3-4mm pupils bilaterally with minimal to no response to light. No doll's eyes   ENT: dry MM  Neck: supple; no JVD or thyromegaly  Respiratory: decreased right sided breath sounds, mechanical breath sounds heard on the left. No rales, no rhonchi, no retractions on left  Cardiac: +S1/S2; RRR; no M/R/G  Gastrointestinal: soft, NT/ND; no rebound or guarding; +BSx4  Genitourinary: normal external genitalia  Extremities: WWP, no clubbing or cyanosis; no peripheral edema  Vascular: 2+ radial, femoral, DP/PT pulses B/L  Dermatologic: skin warm, dry and intact; no rashes, wounds, or scars  Lymphatic: no submandibular or cervical LAD  Neurologic: Intubated, not arousable. Minimal to no pupil response to light, no response to threat, no response to noxious stimuli    LABS:  CBC Full  -  ( 18 Dec 2018 12:38 )  WBC Count : 13.1 K/uL  Hemoglobin : 16.2 g/dL  Hematocrit : 52.6 %  Platelet Count - Automated : 169 K/uL  Mean Cell Volume : 97.6 fL  Mean Cell Hemoglobin : 30.1 pg  Mean Cell Hemoglobin Concentration : 30.8 g/dL  Auto Neutrophil # : x  Auto Lymphocyte # : x  Auto Monocyte # : x  Auto Eosinophil # : x  Auto Basophil # : x  Auto Neutrophil % : x  Auto Lymphocyte % : x  Auto Monocyte % : x  Auto Eosinophil % : x  Auto Basophil % : x    12-18    142  |  98  |  35<H>  ----------------------------<  218<H>  4.6   |  29  |  1.63<H>    Ca    8.7      18 Dec 2018 14:16    TPro  5.4<L>  /  Alb  3.0<L>  /  TBili  0.4  /  DBili  x   /  AST  354<H>  /  ALT  258<H>  /  AlkPhos  89  12-18    CAPILLARY BLOOD GLUCOSE      POCT Blood Glucose.: 344 mg/dL (18 Dec 2018 11:54)    PT/INR - ( 18 Dec 2018 12:38 )   PT: 12.1 sec;   INR: 1.07          PTT - ( 18 Dec 2018 12:38 )  PTT:33.4 sec      EKG:    ECHO, US:    RADIOLOGY:    CRITICAL CARE TIME SPENT: MICU CONSULT NOTE    HPI: History obtained from wife at bedside. Patient is a 63M w/hx of asthma, DM2, & HTN who presented to [facility] ED in cardiac arrest. Per the wife, patient has been feeling progressively weaker and unwell over the last few weeks, with the wife noting increased swelling in the patient's legs and SOB, worse over the last two days and not improved by inhaler use. The patient presented to his PMD's office today, who told the patient to present to the emergency room after evaluating him in the waiting area of the clinic. The patient drove home, parked his car, and took a taxi with his wife to [facility]. Per report, ED staff saw the  having difficulty helping the patient exit the cab outside of the ED and found the patient to be in cardiac arrest. CPR initiated on the street; presenting rhythm asystole. Patient arrived in the resuscitation room with ongoing compressions at 11:50, with ROSC ultimately obtained at 12:26. At one point, the rhythm was believed to be VTach, and one shock delivered. Patient intubated during code and started on levophed for pressor support. Found to have R pneumothorax on CXR; chest tube placed by CT Surgery. Given epi x4, bicarb, magnesium, solumedrol during code. Ordered for ativan 1mg IVP, keprra 1g IV, versed 5mg IVP, propofol, 2L NS bolus post code. Cardiology consulted in ED; low suspicion for ACS event upon review of EKG, bedside echo. Formal echocardiogram revealed dilated RV with decreased right sided function. Patient started on heparin gtt. MICU consulted for ICU placement.      Allergies    No Known Allergies    Intolerances        MEDICATIONS  (STANDING):  ALBUTerol/ipratropium for Nebulization 3 milliLiter(s) Nebulizer every 4 hours  heparin  Infusion 1400 Unit(s)/Hr (14 mL/Hr) IV Continuous <Continuous>  propofol Infusion 5 MICROgram(s)/kG/Min (2.4 mL/Hr) IV Continuous <Continuous>  sodium chloride 0.9%. 1000 milliLiter(s) (125 mL/Hr) IV Continuous <Continuous>  vasopressin Infusion 0.04 Unit(s)/Min (2.4 mL/Hr) IV Continuous <Continuous>    MEDICATIONS  (PRN):      Drug Dosing Weight    Weight (kg): 80 (18 Dec 2018 13:32)    PAST MEDICAL & SURGICAL HISTORY: DM2, HTN, asthma      FAMILY HISTORY: unable to obtain    SOCIAL HISTORY: unable to obtain    ADVANCE DIRECTIVES:    REVIEW OF SYSTEMS: unable to obtain    ICU Vital Signs Last 24 Hrs  T(C): 37.1 (18 Dec 2018 13:34), Max: 37.1 (18 Dec 2018 12:42)  T(F): 98.7 (18 Dec 2018 13:34), Max: 98.7 (18 Dec 2018 12:42)  HR: 84 (18 Dec 2018 13:34) (0 - 84)  BP: 94/63 (18 Dec 2018 13:34) (0/0 - 94/63)  BP(mean): --  ABP: --  ABP(mean): --  RR: 14 (18 Dec 2018 13:34) (0 - 14)  SpO2: 100% (18 Dec 2018 12:40) (100% - 100%)      ABG - ( 18 Dec 2018 14:15 )  pH, Arterial: 7.10  pH, Blood: x     /  pCO2: 103   /  pO2: 393   / HCO3: 31    / Base Excess: -2.4  /  SaO2: 100                 I&O's Detail      PHYSICAL EXAM: Note - patient examined prior to chest tube insertion    Constitutional: Intubated with periodic myoclonic jerks  Head: NC/AT  Eyes: Eyes open, scleral injection, 3-4mm pupils bilaterally with minimal to no response to light. No doll's eyes   ENT: dry MM  Neck: supple; no JVD or thyromegaly  Respiratory: decreased right sided breath sounds, mechanical breath sounds heard on the left. No rales, no rhonchi, no retractions on left  Cardiac: +S1/S2; RRR; no M/R/G  Gastrointestinal: soft, NT/ND; no rebound or guarding; +BSx4  Genitourinary: normal external genitalia  Extremities: Pale lower extremities, but warm. B/L LE pitting edema extending to the thighs. IO IV in LLE  Vascular: 2+ radial, 2+ L DP/PT pulse, 1+right.   Lymphatic: no submandibular or cervical LAD  Neurologic: Intubated, not arousable. Minimal to no pupil response to light, no response to threat, no response to noxious stimuli    LABS:  CBC Full  -  ( 18 Dec 2018 12:38 )  WBC Count : 13.1 K/uL  Hemoglobin : 16.2 g/dL  Hematocrit : 52.6 %  Platelet Count - Automated : 169 K/uL  Mean Cell Volume : 97.6 fL  Mean Cell Hemoglobin : 30.1 pg  Mean Cell Hemoglobin Concentration : 30.8 g/dL  Auto Neutrophil # : x  Auto Lymphocyte # : x  Auto Monocyte # : x  Auto Eosinophil # : x  Auto Basophil # : x  Auto Neutrophil % : x  Auto Lymphocyte % : x  Auto Monocyte % : x  Auto Eosinophil % : x  Auto Basophil % : x    12-18    142  |  98  |  35<H>  ----------------------------<  218<H>  4.6   |  29  |  1.63<H>    Ca    8.7      18 Dec 2018 14:16    TPro  5.4<L>  /  Alb  3.0<L>  /  TBili  0.4  /  DBili  x   /  AST  354<H>  /  ALT  258<H>  /  AlkPhos  89  12-18    CAPILLARY BLOOD GLUCOSE      POCT Blood Glucose.: 344 mg/dL (18 Dec 2018 11:54)    PT/INR - ( 18 Dec 2018 12:38 )   PT: 12.1 sec;   INR: 1.07          PTT - ( 18 Dec 2018 12:38 )  PTT:33.4 sec      EKG:    ECHO, US:    RADIOLOGY:    CRITICAL CARE TIME SPENT: MICU CONSULT NOTE    HPI: History obtained from wife at bedside. Patient is a 63M w/hx of asthma/COPD (smoker, reported hx of exacerbations in past), DM2, & HTN who presented to [facility] ED in cardiac arrest. Per the wife, patient has been feeling progressively weaker and unwell over the last few weeks, with the wife noting increased swelling in the patient's legs and SOB, worse over the last two days and not improved by inhaler use. The patient presented to his PMD's office today, who told the patient to present to the emergency room after evaluating him in the waiting area of the clinic. The patient drove home, parked his car, and took a taxi with his wife to [facility]. Per report, ED staff saw the  having difficulty helping the patient exit the cab outside of the ED and found the patient to be in cardiac arrest. CPR initiated on the street; presenting rhythm asystole. Patient arrived in the resuscitation room with ongoing compressions at 11:50, with ROSC ultimately obtained at 12:26. At one point, the rhythm was believed to be VTach, and one shock delivered. Patient intubated during code and started on levophed for pressor support. Found to have R pneumothorax on CXR; chest tube placed by CT Surgery. Given epi x4, bicarb, magnesium, solumedrol during code. Ordered for ativan 1mg IVP, keprra 1g IV, versed 5mg IVP, propofol, 2L NS bolus post code. Cardiology consulted in ED; low suspicion for ACS event upon review of EKG, bedside echo. Formal echocardiogram revealed dilated RV with decreased right sided function. Patient started on heparin gtt. MICU consulted for ICU placement.      Allergies    No Known Allergies    Intolerances        MEDICATIONS  (STANDING):  ALBUTerol/ipratropium for Nebulization 3 milliLiter(s) Nebulizer every 4 hours  heparin  Infusion 1400 Unit(s)/Hr (14 mL/Hr) IV Continuous <Continuous>  propofol Infusion 5 MICROgram(s)/kG/Min (2.4 mL/Hr) IV Continuous <Continuous>  sodium chloride 0.9%. 1000 milliLiter(s) (125 mL/Hr) IV Continuous <Continuous>  vasopressin Infusion 0.04 Unit(s)/Min (2.4 mL/Hr) IV Continuous <Continuous>    MEDICATIONS  (PRN):      Drug Dosing Weight    Weight (kg): 80 (18 Dec 2018 13:32)    PAST MEDICAL & SURGICAL HISTORY: DM2, HTN, asthma      FAMILY HISTORY: Brother  at 51 yo from heart disease. Parents  (Both over 79 yo)    SOCIAL HISTORY: "Heavy" smoker per PMD. Kyr[place]z speaking Originally from [facility] [place]. Remarried 20 years. Has adult children from prior marriage. All other family in [place]. Was living with wife in [facility] apartment. [facility]    ADVANCE DIRECTIVES:    REVIEW OF SYSTEMS: unable to obtain    ICU Vital Signs Last 24 Hrs  T(C): 37.1 (18 Dec 2018 13:34), Max: 37.1 (18 Dec 2018 12:42)  T(F): 98.7 (18 Dec 2018 13:34), Max: 98.7 (18 Dec 2018 12:42)  HR: 84 (18 Dec 2018 13:34) (0 - 84)  BP: 94/63 (18 Dec 2018 13:34) (0/0 - 94/63)  BP(mean): --  ABP: --  ABP(mean): --  RR: 14 (18 Dec 2018 13:34) (0 - 14)  SpO2: 100% (18 Dec 2018 12:40) (100% - 100%)      ABG - ( 18 Dec 2018 14:15 )  pH, Arterial: 7.10  pH, Blood: x     /  pCO2: 103   /  pO2: 393   / HCO3: 31    / Base Excess: -2.4  /  SaO2: 100                 I&O's Detail      PHYSICAL EXAM: Note - patient examined prior to chest tube insertion    Constitutional: Intubated with periodic myoclonic jerks  Head: NC/AT  Eyes: Eyes open, scleral injection, 3-4mm pupils bilaterally with minimal to no response to light. No doll's eyes   ENT: dry MM  Neck: supple; no JVD or thyromegaly  Respiratory: decreased right sided breath sounds, mechanical breath sounds heard on the left. No rales, no rhonchi, no retractions on left  Cardiac: +S1/S2; RRR; no M/R/G  Gastrointestinal: soft, NT/ND; no rebound or guarding; +BSx4  Genitourinary: normal external genitalia  Extremities: Pale lower extremities, but warm. B/L LE pitting edema extending to the thighs. IO IV in LLE  Vascular: 2+ radial, 2+ L DP/PT pulse, 1+right.   Lymphatic: no submandibular or cervical LAD  Neurologic: Intubated, not arousable. Minimal to no pupil response to light, no response to threat, no response to noxious stimuli    LABS:  CBC Full  -  ( 18 Dec 2018 12:38 )  WBC Count : 13.1 K/uL  Hemoglobin : 16.2 g/dL  Hematocrit : 52.6 %  Platelet Count - Automated : 169 K/uL  Mean Cell Volume : 97.6 fL  Mean Cell Hemoglobin : 30.1 pg  Mean Cell Hemoglobin Concentration : 30.8 g/dL  Auto Neutrophil # : x  Auto Lymphocyte # : x  Auto Monocyte # : x  Auto Eosinophil # : x  Auto Basophil # : x  Auto Neutrophil % : x  Auto Lymphocyte % : x  Auto Monocyte % : x  Auto Eosinophil % : x  Auto Basophil % : x        142  |  98  |  35<H>  ----------------------------<  218<H>  4.6   |  29  |  1.63<H>    Ca    8.7      18 Dec 2018 14:16    TPro  5.4<L>  /  Alb  3.0<L>  /  TBili  0.4  /  DBili  x   /  AST  354<H>  /  ALT  258<H>  /  AlkPhos  89  1218    CAPILLARY BLOOD GLUCOSE      POCT Blood Glucose.: 344 mg/dL (18 Dec 2018 11:54)    PT/INR - ( 18 Dec 2018 12:38 )   PT: 12.1 sec;   INR: 1.07          PTT - ( 18 Dec 2018 12:38 )  PTT:33.4 sec      EKG:    ECHO, US:    RADIOLOGY:    CRITICAL CARE TIME SPENT:

## 2018-12-18 NOTE — PATIENT PROFILE ADULT - BRADEN ACTIVITY
Pt stood for ortho stats and became very dizzy when up right.  BP 68/  Maribeth notified and fluid bolus up to 999/hr.   (1) bedfast

## 2018-12-18 NOTE — ED PROVIDER NOTE - DIAGNOSTIC INTERPRETATION
ER Physician:  Naida Director  CHEST XRAY INTERPRETATION: R ptx, ett high in trachea, heart shadow normal, bony structures intact   ER Physician:  Naida Director  CHEST XRAY INTERPRETATION: R ptx, ett in good position, heart shadow normal, bony structures intact   ER Physician:  Naida Director  CHEST XRAY INTERPRETATION: R chest tube w improved ptx, bilat pulm congestion, ett in good position, heart shadow normal, bony structures intact

## 2018-12-18 NOTE — CONSULT NOTE ADULT - PROBLEM SELECTOR RECOMMENDATION 2
Patient ROSC of 30min. Myoclonic jerks conveying a very poor prognosis.   Wife updated in [place] about the critical nature and poor prognosis.

## 2018-12-18 NOTE — H&P ADULT - HISTORY OF PRESENT ILLNESS
NOTE INCOMPLETE**** NOTE INCOMPLETE****    This is a 62 yo man with history of asthma, T2DM, and HTN who presents in cardiac arrest. Patient was found outside hospital in a taxi by ED nurse and CPR was initiated on the street.     In ED, patient was in asystole, ACLS protocol began, and patient was intubated- patient received 4x epi with ROSC at 12:12. Patient received Keppra 1g, Ativan 1mg, 2L NS, started on versed, propofol, and vasopressor ggt. 64 y/o M heavy smoker with a history of COPD, NIDDM, and HTN, presented to ED in cardiac arrest. He was seen by PMD Dr. Sonido Rollins this morning in [place] and had complained of progressively worsening lethargy and SOB for the past several weeks. As per a telephone conversation with Dr. Rollins, the patient appeared to be having difficulty breathing. Dr. Rollins instructed the patient to go to the ER, but the patient preferred to go to [facility]. He drove home and took a cab to [facility] with his wife. Per report, ED staff saw the  having difficulty helping the patient exit the cab outside of the ED and found the patient to be in cardiac arrest. CPR initiated on the street; presenting rhythm asystole.     Patient arrived in the resuscitation room with ongoing compressions at 11:50, with ROSC ultimately obtained at 12:26. At one point, the rhythm was believed to be VTach, and one shock delivered. Patient intubated during the code and started on levophed for pressor support. Found to have R pneumothorax on CXR; chest tube placed by CT Surgery. Given epi x4, bicarb, magnesium, solumedrol during code. Ordered for ativan 1mg IVP, keprra 1g IV, versed 5mg IVP, propofol, 2L NS bolus post code. Cardiology consulted in ED; low suspicion for ACS event upon review of EKG, bedside echo. Formal echocardiogram revealed dilated RV with decreased right sided function. Patient started on heparin gtt. MICU consulted for ICU placement.    Family history: Brother  at 51 yo from heart disease. Parents  (Both over 79 yo)

## 2018-12-18 NOTE — PATIENT PROFILE ADULT - DO YOU WANT TO COMPLETE THE HCP AND NAME A HEALTH CARE AGENT
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 YES      Risk Factors for Postoperative nausea/vomiting:       History of postoperative nausea/vomiting? NO       Female? YES       Motion sickness? NO       Intended opioid administration for postoperative analgesia? NO      Smoking Abstinence:  Current Smoker? NO  Elective Surgery? YES  Seen preoperatively by anesthesiologist or proxy prior to day of surgery? YES  Pt abstained from smoking 24 hours prior to anesthesia?  N/A    Preventive care/screening for High Blood Pressure:  Aged 18 years and older: YES  Screened for high blood pressure: YES  Patients with high blood pressure referred to primary care provider   for BP management: YES                 Physical Exam    Airway  Mallampati: II  TM Distance: 4 - 6 cm  Neck ROM: normal range of motion   Mouth opening: Normal     Cardiovascular  Regular rate and rhythm,  S1 and S2 normal,  no murmur, click, rub, or gallop  Rhythm: regular  Rate: normal         Dental    Dentition: Lower dentition intact and Upper dentition intact     Pulmonary  Breath sounds clear to auscultation               Abdominal  GI exam deferred       Other Findings            Anesthetic Plan    ASA: 2  Anesthesia type: MAC          Induction: Intravenous  Anesthetic plan and risks discussed with: Patient no

## 2018-12-18 NOTE — ED PEDIATRIC NURSE REASSESSMENT NOTE - NS ED NURSE REASSESS COMMENT FT2
1Pt presents in Asystole compression already in progress as per ACLS protocol , 11:50 epi given , intubation started 11:51 tube placed 7ET 22 at lip , 11:52 no pulse epi given compressions continue, 11:53 blood glucose 344, 11:54 V tach stock given 150j , femoral pulse , 2nd ekg done, v/s hr 55 unable to obtain b/p, 11:57 Calcium given , hr 107,1202 b/p 78/49 hr - 84, 1205 x-ray done, 1208 heart rate dropping 48, no pulse compression started, 1209 epi given. 1210 pulse check no pulse, levo started 0.2 mcg, 1217 heart rate 67 b/p 82/44 levo increased to 0.3 mcg, 1224 pt is pulse asystole compressions started  1225 epi given levo increased to 0.4 b/p 62/23, 1226 pulse check femoral pulse noted, 1229 bicarb given, 1229 mag , 1230 bp 142/90  1232 solumedrol  TPA drip at 1236 b/p 180/86 hr 107  1211 epi given , 1212 b/p 160/63 hr, 54

## 2018-12-18 NOTE — CONSULT NOTE ADULT - PROBLEM SELECTOR RECOMMENDATION 7
Support provided to patient and family. Patient to have access to supportive services during rest of hospital stay as the patient/family deemed necessary ie. Chaplaincy, Massage therapy, Music therapy, Patient and family supportive services, Palliative SW, etc.    PSSA Pending.

## 2018-12-18 NOTE — H&P ADULT - NSHPLABSRESULTS_GEN_ALL_CORE
.  LABS:                         16.2   13.1  )-----------( 169      ( 18 Dec 2018 12:38 )             52.6     12-18    138  |  88<L>  |  34<H>  ----------------------------<  363<H>  see note   |  28  |  1.47<H>    Ca    9.3      18 Dec 2018 12:39    TPro  6.6  /  Alb  3.3  /  TBili  0.4  /  DBili  x   /  AST  see note  /  ALT  see note  /  AlkPhos  see note  12-18    PT/INR - ( 18 Dec 2018 12:38 )   PT: 12.1 sec;   INR: 1.07          PTT - ( 18 Dec 2018 12:38 )  PTT:33.4 sec    CARDIAC MARKERS ( 18 Dec 2018 12:39 )  x     / 0.04 ng/mL / see note U/L / x     / x        Serum Pro-Brain Natriuretic Peptide: 12825 pg/mL (12-18 @ 12:39) .  LABS:                         16.2   13.1  )-----------( 169      ( 18 Dec 2018 12:38 )             52.6     12-18    138  |  88<L>  |  34<H>  ----------------------------<  363<H>  see note   |  28  |  1.47<H>    Ca    9.3      18 Dec 2018 12:39    TPro  6.6  /  Alb  3.3  /  TBili  0.4  /  DBili  x   /  AST  see note  /  ALT  see note  /  AlkPhos  see note  12-18    PT/INR - ( 18 Dec 2018 12:38 )   PT: 12.1 sec;   INR: 1.07     PTT - ( 18 Dec 2018 12:38 )  PTT:33.4 sec    CARDIAC MARKERS ( 18 Dec 2018 12:39 )  x     / 0.04 ng/mL / see note U/L / x     / x        Serum Pro-Brain Natriuretic Peptide: 30799 pg/mL (12-18 @ 12:39)

## 2018-12-18 NOTE — CONSULT NOTE ADULT - PROBLEM SELECTOR RECOMMENDATION 5
Support provided to patient and family. Patient to have access to supportive services during rest of hospital stay as the patient/family deemed necessary ie. Chaplaincy, Massage therapy, Music therapy, Patient and family supportive services, Palliative SW, etc.    PSSA Pending. In addition to the EM visit, an advance care planning meeting was performed  Start time: 2:25pm  End time: 3:15pm  Total time: 50min  A face to face meeting to discuss advance care planning was held today regarding: BRIAN CHERRY  Primary decision maker: Wife Jenniffer Eriberto [phone]  Discussed advance directives including, but not limited to, healthcare proxy and code status.  Decision regarding code status: DNR  Documentation completed today: PATTI

## 2018-12-18 NOTE — PROCEDURE NOTE - NSINDICATIONS_GEN_A_CORE
critical patient/monitoring purposes/arterial puncture to obtain ABG's
emergency venous access/volume resuscitation

## 2018-12-18 NOTE — PATIENT PROFILE ADULT - NSASFUNCLEVELADLTRANSFER_GEN_A_NUR
Arrives ambulatory with reports of a headache for the past two days. Reports all over his head and worse behind his left eye.   Denies injury.  
0 = independent

## 2018-12-18 NOTE — ED PROVIDER NOTE - OBJECTIVE STATEMENT
64 yo male h/o copd, dm, htn brought in to ed in cardiac arrest.  Pt reportedly saw pmd/pulmonologist today c/o sob and pmd instructed pt to come to ed.  Pt took a cab and became unresponsive in the cab.  ED RN nearby and found pt in cardiac arrest; she started cpr.

## 2018-12-18 NOTE — ED ADULT NURSE NOTE - OBJECTIVE STATEMENT
Pt found outside of ED in cab by [facility] ED staff. Compression started outside of ED. Pt brought in asystole. ALS protocol as followed. Peripheral IV placed, labs sent, EKG complete. Intubate by MD Director. unknown PMH, unknown cause.

## 2018-12-18 NOTE — ED ADULT NURSE NOTE - NSIMPLEMENTINTERV_GEN_ALL_ED
Implemented All Fall with Harm Risk Interventions:  [place] to call system. Call bell, personal items and telephone within reach. Instruct patient to call for assistance. Room bathroom lighting operational. Non-slip footwear when patient is off stretcher. Physically safe environment: no spills, clutter or unnecessary equipment. Stretcher in lowest position, wheels locked, appropriate side rails in place. Provide visual cue, wrist band, yellow gown, etc. Monitor gait and stability. Monitor for mental status changes and reorient to person, place, and time. Review medications for side effects contributing to fall risk. Reinforce activity limits and safety measures with patient and family. Provide visual clues: red socks.

## 2018-12-18 NOTE — ED PROVIDER NOTE - PROGRESS NOTE DETAILS
Cardiology in ed and evaluating pt - echo w good wall motion, ekg w/o stemi, + rbbb; they feel this is more pulm.  MICU consulted.  CXR's more carefully reviewed for more than tube placement and pt noted to have ptx.  CT surg consulted and will eval.  Will hold on tpa at this time. CT surg in ed placing R chest tube Cardiology in ed and evaluating pt - echo w good wall motion, ekg w/o stemi, + rbbb; they feel this is more pulm.  MICU consulted.  Pt's tube removed and replaced w improved placement on exam and cxr.  CXR's more carefully reviewed for more than tube placement and pt noted to have ptx.  CT surg consulted and will eval.  Will hold on tpa at this time. CT surg in ed placing R chest tube.  Pt w onset of myoclonic jerking, poss sz activity - started on benzos, propofol and keppra.  Pt accepted to micu.

## 2018-12-18 NOTE — CONSULT NOTE ADULT - PROBLEM SELECTOR RECOMMENDATION 3
Discussed with the wife at the bedside at length. She has informed his family in [place] of the events. Unsure if they will be able to travel. She would like to continue current level of care. She is deciding if she will forego any further resuscitations given what she has learned today about his poor prognosis.

## 2018-12-19 LAB
ALBUMIN SERPL ELPH-MCNC: 2.6 G/DL — LOW (ref 3.3–5)
ALP SERPL-CCNC: 84 U/L — SIGNIFICANT CHANGE UP (ref 40–120)
ALT FLD-CCNC: 243 U/L — HIGH (ref 10–45)
ANION GAP SERPL CALC-SCNC: 20 MMOL/L — HIGH (ref 5–17)
ANION GAP SERPL CALC-SCNC: 22 MMOL/L — HIGH (ref 5–17)
APPEARANCE UR: CLEAR — SIGNIFICANT CHANGE UP
APTT BLD: 139.5 SEC — CRITICAL HIGH (ref 27.5–36.3)
APTT BLD: 87.6 SEC — HIGH (ref 27.5–36.3)
APTT BLD: 97.9 SEC — HIGH (ref 27.5–36.3)
APTT BLD: >200 SEC — CRITICAL HIGH (ref 27.5–36.3)
AST SERPL-CCNC: 367 U/L — HIGH (ref 10–40)
BASE EXCESS BLDA CALC-SCNC: -0.4 MMOL/L — SIGNIFICANT CHANGE UP (ref -2–3)
BASE EXCESS BLDA CALC-SCNC: -1.3 MMOL/L — SIGNIFICANT CHANGE UP (ref -2–3)
BASE EXCESS BLDA CALC-SCNC: -1.3 MMOL/L — SIGNIFICANT CHANGE UP (ref -2–3)
BASE EXCESS BLDA CALC-SCNC: -4.4 MMOL/L — LOW (ref -2–3)
BILIRUB SERPL-MCNC: 0.5 MG/DL — SIGNIFICANT CHANGE UP (ref 0.2–1.2)
BILIRUB UR-MCNC: ABNORMAL
BUN SERPL-MCNC: 53 MG/DL — HIGH (ref 7–23)
BUN SERPL-MCNC: 60 MG/DL — HIGH (ref 7–23)
CALCIUM SERPL-MCNC: 7.3 MG/DL — LOW (ref 8.4–10.5)
CALCIUM SERPL-MCNC: 8.1 MG/DL — LOW (ref 8.4–10.5)
CHLORIDE SERPL-SCNC: 94 MMOL/L — LOW (ref 96–108)
CHLORIDE SERPL-SCNC: 98 MMOL/L — SIGNIFICANT CHANGE UP (ref 96–108)
CO2 SERPL-SCNC: 23 MMOL/L — SIGNIFICANT CHANGE UP (ref 22–31)
CO2 SERPL-SCNC: 23 MMOL/L — SIGNIFICANT CHANGE UP (ref 22–31)
COLOR SPEC: YELLOW — SIGNIFICANT CHANGE UP
CREAT ?TM UR-MCNC: 144 MG/DL — SIGNIFICANT CHANGE UP
CREAT SERPL-MCNC: 2.51 MG/DL — HIGH (ref 0.5–1.3)
CREAT SERPL-MCNC: 3.52 MG/DL — HIGH (ref 0.5–1.3)
DIFF PNL FLD: ABNORMAL
GAS PNL BLDV: SIGNIFICANT CHANGE UP
GLUCOSE BLDC GLUCOMTR-MCNC: 289 MG/DL — HIGH (ref 70–99)
GLUCOSE BLDC GLUCOMTR-MCNC: 329 MG/DL — HIGH (ref 70–99)
GLUCOSE BLDC GLUCOMTR-MCNC: 332 MG/DL — HIGH (ref 70–99)
GLUCOSE BLDC GLUCOMTR-MCNC: 374 MG/DL — HIGH (ref 70–99)
GLUCOSE BLDC GLUCOMTR-MCNC: 435 MG/DL — HIGH (ref 70–99)
GLUCOSE SERPL-MCNC: 294 MG/DL — HIGH (ref 70–99)
GLUCOSE SERPL-MCNC: 343 MG/DL — HIGH (ref 70–99)
GLUCOSE UR QL: 100
HAPTOGLOB SERPL-MCNC: SIGNIFICANT CHANGE UP MG/DL (ref 34–200)
HAPTOGLOB SERPL-MCNC: SIGNIFICANT CHANGE UP MG/DL (ref 34–200)
HAV IGM SER-ACNC: SIGNIFICANT CHANGE UP
HBV CORE IGM SER-ACNC: SIGNIFICANT CHANGE UP
HBV SURFACE AG SER-ACNC: SIGNIFICANT CHANGE UP
HCO3 BLDA-SCNC: 22 MMOL/L — SIGNIFICANT CHANGE UP (ref 21–28)
HCO3 BLDA-SCNC: 26 MMOL/L — SIGNIFICANT CHANGE UP (ref 21–28)
HCT VFR BLD CALC: 45.9 % — SIGNIFICANT CHANGE UP (ref 39–50)
HCT VFR BLD CALC: 50.6 % — HIGH (ref 39–50)
HCV AB S/CO SERPL IA: 0.06 S/CO — SIGNIFICANT CHANGE UP
HCV AB SERPL-IMP: SIGNIFICANT CHANGE UP
HGB BLD-MCNC: 14.3 G/DL — SIGNIFICANT CHANGE UP (ref 13–17)
HGB BLD-MCNC: 15.7 G/DL — SIGNIFICANT CHANGE UP (ref 13–17)
INR BLD: 1.35 — HIGH (ref 0.88–1.16)
KETONES UR-MCNC: ABNORMAL MG/DL
LACTATE SERPL-SCNC: 2.6 MMOL/L — HIGH (ref 0.5–2)
LACTATE SERPL-SCNC: 2.8 MMOL/L — HIGH (ref 0.5–2)
LDH SERPL L TO P-CCNC: SIGNIFICANT CHANGE UP U/L (ref 50–242)
LDH SERPL L TO P-CCNC: SIGNIFICANT CHANGE UP U/L (ref 50–242)
LEUKOCYTE ESTERASE UR-ACNC: NEGATIVE — SIGNIFICANT CHANGE UP
MAGNESIUM SERPL-MCNC: 2.3 MG/DL — SIGNIFICANT CHANGE UP (ref 1.6–2.6)
MAGNESIUM SERPL-MCNC: 2.4 MG/DL — SIGNIFICANT CHANGE UP (ref 1.6–2.6)
MCHC RBC-ENTMCNC: 29.4 PG — SIGNIFICANT CHANGE UP (ref 27–34)
MCHC RBC-ENTMCNC: 29.7 PG — SIGNIFICANT CHANGE UP (ref 27–34)
MCHC RBC-ENTMCNC: 31 G/DL — LOW (ref 32–36)
MCHC RBC-ENTMCNC: 31.2 G/DL — LOW (ref 32–36)
MCV RBC AUTO: 94.3 FL — SIGNIFICANT CHANGE UP (ref 80–100)
MCV RBC AUTO: 95.7 FL — SIGNIFICANT CHANGE UP (ref 80–100)
NITRITE UR-MCNC: NEGATIVE — SIGNIFICANT CHANGE UP
OSMOLALITY UR: 442 MOSMOL/KG — SIGNIFICANT CHANGE UP (ref 100–650)
PCO2 BLDA: 45 MMHG — SIGNIFICANT CHANGE UP (ref 35–48)
PCO2 BLDA: 50 MMHG — HIGH (ref 35–48)
PCO2 BLDA: 51 MMHG — HIGH (ref 35–48)
PCO2 BLDA: 53 MMHG — HIGH (ref 35–48)
PF4 HEPARIN CMPLX AB SER-ACNC: NEGATIVE — SIGNIFICANT CHANGE UP
PF4 HEPARIN CMPLX AB SERPL QL IA: 0.21 ABS — SIGNIFICANT CHANGE UP
PH BLDA: 7.31 — LOW (ref 7.35–7.45)
PH BLDA: 7.31 — LOW (ref 7.35–7.45)
PH BLDA: 7.32 — LOW (ref 7.35–7.45)
PH BLDA: 7.33 — LOW (ref 7.35–7.45)
PH UR: 5 — SIGNIFICANT CHANGE UP (ref 5–8)
PHOSPHATE SERPL-MCNC: 6.5 MG/DL — HIGH (ref 2.5–4.5)
PHOSPHATE SERPL-MCNC: 6.7 MG/DL — HIGH (ref 2.5–4.5)
PLATELET # BLD AUTO: 88 K/UL — LOW (ref 150–400)
PLATELET # BLD AUTO: 91 K/UL — LOW (ref 150–400)
PO2 BLDA: 69 MMHG — LOW (ref 83–108)
PO2 BLDA: 82 MMHG — LOW (ref 83–108)
PO2 BLDA: 85 MMHG — SIGNIFICANT CHANGE UP (ref 83–108)
PO2 BLDA: 92 MMHG — SIGNIFICANT CHANGE UP (ref 83–108)
POTASSIUM SERPL-MCNC: 4.4 MMOL/L — SIGNIFICANT CHANGE UP (ref 3.5–5.3)
POTASSIUM SERPL-MCNC: 5.1 MMOL/L — SIGNIFICANT CHANGE UP (ref 3.5–5.3)
POTASSIUM SERPL-SCNC: 4.4 MMOL/L — SIGNIFICANT CHANGE UP (ref 3.5–5.3)
POTASSIUM SERPL-SCNC: 5.1 MMOL/L — SIGNIFICANT CHANGE UP (ref 3.5–5.3)
PROT SERPL-MCNC: 5 G/DL — LOW (ref 6–8.3)
PROT UR-MCNC: 100 MG/DL
PROTHROM AB SERPL-ACNC: 15.4 SEC — HIGH (ref 10–12.9)
RBC # BLD: 4.87 M/UL — SIGNIFICANT CHANGE UP (ref 4.2–5.8)
RBC # BLD: 4.87 M/UL — SIGNIFICANT CHANGE UP (ref 4.2–5.8)
RBC # BLD: 5.29 M/UL — SIGNIFICANT CHANGE UP (ref 4.2–5.8)
RBC # FLD: 14.8 % — SIGNIFICANT CHANGE UP (ref 10.3–16.9)
RBC # FLD: 14.8 % — SIGNIFICANT CHANGE UP (ref 10.3–16.9)
RETICS/RBC NFR: 2.4 % — SIGNIFICANT CHANGE UP (ref 0.5–2.5)
SAO2 % BLDA: 92 % — LOW (ref 95–100)
SAO2 % BLDA: 95 % — SIGNIFICANT CHANGE UP (ref 95–100)
SAO2 % BLDA: 96 % — SIGNIFICANT CHANGE UP (ref 95–100)
SAO2 % BLDA: 96 % — SIGNIFICANT CHANGE UP (ref 95–100)
SODIUM SERPL-SCNC: 139 MMOL/L — SIGNIFICANT CHANGE UP (ref 135–145)
SODIUM SERPL-SCNC: 141 MMOL/L — SIGNIFICANT CHANGE UP (ref 135–145)
SODIUM UR-SCNC: 34 MMOL/L — SIGNIFICANT CHANGE UP
SP GR SPEC: >=1.03 — SIGNIFICANT CHANGE UP (ref 1–1.03)
UROBILINOGEN FLD QL: 0.2 E.U./DL — SIGNIFICANT CHANGE UP
WBC # BLD: 16 K/UL — HIGH (ref 3.8–10.5)
WBC # BLD: 19.8 K/UL — HIGH (ref 3.8–10.5)
WBC # FLD AUTO: 16 K/UL — HIGH (ref 3.8–10.5)
WBC # FLD AUTO: 19.8 K/UL — HIGH (ref 3.8–10.5)

## 2018-12-19 PROCEDURE — 71045 X-RAY EXAM CHEST 1 VIEW: CPT | Mod: 26

## 2018-12-19 PROCEDURE — 99291 CRITICAL CARE FIRST HOUR: CPT

## 2018-12-19 PROCEDURE — 70450 CT HEAD/BRAIN W/O DYE: CPT | Mod: 26

## 2018-12-19 PROCEDURE — 95951: CPT | Mod: 26

## 2018-12-19 PROCEDURE — 71045 X-RAY EXAM CHEST 1 VIEW: CPT | Mod: 26,77

## 2018-12-19 PROCEDURE — 99233 SBSQ HOSP IP/OBS HIGH 50: CPT | Mod: GC

## 2018-12-19 PROCEDURE — 99233 SBSQ HOSP IP/OBS HIGH 50: CPT

## 2018-12-19 RX ORDER — PIPERACILLIN AND TAZOBACTAM 4; .5 G/20ML; G/20ML
3.38 INJECTION, POWDER, LYOPHILIZED, FOR SOLUTION INTRAVENOUS EVERY 6 HOURS
Qty: 0 | Refills: 0 | Status: DISCONTINUED | OUTPATIENT
Start: 2018-12-19 | End: 2018-12-19

## 2018-12-19 RX ORDER — ACETAMINOPHEN 500 MG
1000 TABLET ORAL ONCE
Qty: 0 | Refills: 0 | Status: DISCONTINUED | OUTPATIENT
Start: 2018-12-19 | End: 2018-12-20

## 2018-12-19 RX ORDER — HEPARIN SODIUM 5000 [USP'U]/ML
900 INJECTION INTRAVENOUS; SUBCUTANEOUS
Qty: 25000 | Refills: 0 | Status: DISCONTINUED | OUTPATIENT
Start: 2018-12-19 | End: 2018-12-19

## 2018-12-19 RX ORDER — ACETAMINOPHEN 500 MG
1000 TABLET ORAL ONCE
Qty: 0 | Refills: 0 | Status: DISCONTINUED | OUTPATIENT
Start: 2018-12-19 | End: 2018-12-19

## 2018-12-19 RX ORDER — LEVETIRACETAM 250 MG/1
750 TABLET, FILM COATED ORAL EVERY 12 HOURS
Qty: 0 | Refills: 0 | Status: DISCONTINUED | OUTPATIENT
Start: 2018-12-19 | End: 2018-12-20

## 2018-12-19 RX ORDER — SODIUM CHLORIDE 9 MG/ML
1000 INJECTION INTRAMUSCULAR; INTRAVENOUS; SUBCUTANEOUS ONCE
Qty: 0 | Refills: 0 | Status: COMPLETED | OUTPATIENT
Start: 2018-12-19 | End: 2018-12-19

## 2018-12-19 RX ORDER — INSULIN GLARGINE 100 [IU]/ML
12 INJECTION, SOLUTION SUBCUTANEOUS AT BEDTIME
Qty: 0 | Refills: 0 | Status: DISCONTINUED | OUTPATIENT
Start: 2018-12-19 | End: 2018-12-20

## 2018-12-19 RX ORDER — PANTOPRAZOLE SODIUM 20 MG/1
40 TABLET, DELAYED RELEASE ORAL DAILY
Qty: 0 | Refills: 0 | Status: DISCONTINUED | OUTPATIENT
Start: 2018-12-19 | End: 2018-12-20

## 2018-12-19 RX ORDER — VANCOMYCIN HCL 1 G
1250 VIAL (EA) INTRAVENOUS EVERY 24 HOURS
Qty: 0 | Refills: 0 | Status: DISCONTINUED | OUTPATIENT
Start: 2018-12-19 | End: 2018-12-19

## 2018-12-19 RX ORDER — HYDROCORTISONE 20 MG
50 TABLET ORAL EVERY 6 HOURS
Qty: 0 | Refills: 0 | Status: DISCONTINUED | OUTPATIENT
Start: 2018-12-19 | End: 2018-12-21

## 2018-12-19 RX ORDER — PIPERACILLIN AND TAZOBACTAM 4; .5 G/20ML; G/20ML
3.38 INJECTION, POWDER, LYOPHILIZED, FOR SOLUTION INTRAVENOUS EVERY 6 HOURS
Qty: 0 | Refills: 0 | Status: DISCONTINUED | OUTPATIENT
Start: 2018-12-20 | End: 2018-12-20

## 2018-12-19 RX ORDER — ARGATROBAN 50 MG/50ML
0.5 INJECTION, SOLUTION INTRAVENOUS
Qty: 250 | Refills: 0 | Status: DISCONTINUED | OUTPATIENT
Start: 2018-12-19 | End: 2018-12-19

## 2018-12-19 RX ORDER — HEPARIN SODIUM 5000 [USP'U]/ML
600 INJECTION INTRAVENOUS; SUBCUTANEOUS
Qty: 25000 | Refills: 0 | Status: DISCONTINUED | OUTPATIENT
Start: 2018-12-19 | End: 2018-12-20

## 2018-12-19 RX ORDER — NOREPINEPHRINE BITARTRATE/D5W 8 MG/250ML
0.04 PLASTIC BAG, INJECTION (ML) INTRAVENOUS
Qty: 32 | Refills: 0 | Status: DISCONTINUED | OUTPATIENT
Start: 2018-12-19 | End: 2018-12-21

## 2018-12-19 RX ORDER — HEPARIN SODIUM 5000 [USP'U]/ML
600 INJECTION INTRAVENOUS; SUBCUTANEOUS
Qty: 25000 | Refills: 0 | Status: DISCONTINUED | OUTPATIENT
Start: 2018-12-19 | End: 2018-12-19

## 2018-12-19 RX ORDER — ACETAMINOPHEN 500 MG
1000 TABLET ORAL ONCE
Qty: 0 | Refills: 0 | Status: COMPLETED | OUTPATIENT
Start: 2018-12-19 | End: 2018-12-19

## 2018-12-19 RX ADMIN — SODIUM CHLORIDE 1000 MILLILITER(S): 9 INJECTION INTRAMUSCULAR; INTRAVENOUS; SUBCUTANEOUS at 04:38

## 2018-12-19 RX ADMIN — HEPARIN SODIUM 6 UNIT(S)/HR: 5000 INJECTION INTRAVENOUS; SUBCUTANEOUS at 21:22

## 2018-12-19 RX ADMIN — Medication 3 MILLILITER(S): at 01:57

## 2018-12-19 RX ADMIN — Medication 3 MILLILITER(S): at 14:56

## 2018-12-19 RX ADMIN — Medication 50 MILLIGRAM(S): at 09:33

## 2018-12-19 RX ADMIN — PIPERACILLIN AND TAZOBACTAM 200 GRAM(S): 4; .5 INJECTION, POWDER, LYOPHILIZED, FOR SOLUTION INTRAVENOUS at 12:03

## 2018-12-19 RX ADMIN — Medication 166.67 MILLIGRAM(S): at 02:00

## 2018-12-19 RX ADMIN — Medication 12: at 23:15

## 2018-12-19 RX ADMIN — Medication 50 MILLIGRAM(S): at 18:16

## 2018-12-19 RX ADMIN — Medication 3 MILLILITER(S): at 06:24

## 2018-12-19 RX ADMIN — PANTOPRAZOLE SODIUM 40 MILLIGRAM(S): 20 TABLET, DELAYED RELEASE ORAL at 12:02

## 2018-12-19 RX ADMIN — PROPOFOL 2.4 MICROGRAM(S)/KG/MIN: 10 INJECTION, EMULSION INTRAVENOUS at 04:55

## 2018-12-19 RX ADMIN — Medication 10: at 18:16

## 2018-12-19 RX ADMIN — Medication 3 MILLILITER(S): at 22:26

## 2018-12-19 RX ADMIN — Medication 6: at 07:12

## 2018-12-19 RX ADMIN — Medication 25 MICROGRAM(S): at 21:19

## 2018-12-19 RX ADMIN — Medication 8: at 12:03

## 2018-12-19 RX ADMIN — CHLORHEXIDINE GLUCONATE 15 MILLILITER(S): 213 SOLUTION TOPICAL at 18:16

## 2018-12-19 RX ADMIN — PIPERACILLIN AND TAZOBACTAM 200 GRAM(S): 4; .5 INJECTION, POWDER, LYOPHILIZED, FOR SOLUTION INTRAVENOUS at 18:16

## 2018-12-19 RX ADMIN — Medication 1000 MILLIGRAM(S): at 00:44

## 2018-12-19 RX ADMIN — CHLORHEXIDINE GLUCONATE 1 APPLICATION(S): 213 SOLUTION TOPICAL at 06:49

## 2018-12-19 RX ADMIN — Medication 3 MILLILITER(S): at 10:35

## 2018-12-19 RX ADMIN — PIPERACILLIN AND TAZOBACTAM 200 GRAM(S): 4; .5 INJECTION, POWDER, LYOPHILIZED, FOR SOLUTION INTRAVENOUS at 00:39

## 2018-12-19 RX ADMIN — PIPERACILLIN AND TAZOBACTAM 200 GRAM(S): 4; .5 INJECTION, POWDER, LYOPHILIZED, FOR SOLUTION INTRAVENOUS at 06:49

## 2018-12-19 RX ADMIN — LEVETIRACETAM 400 MILLIGRAM(S): 250 TABLET, FILM COATED ORAL at 21:19

## 2018-12-19 RX ADMIN — CHLORHEXIDINE GLUCONATE 15 MILLILITER(S): 213 SOLUTION TOPICAL at 06:54

## 2018-12-19 RX ADMIN — Medication 50 MILLIGRAM(S): at 23:15

## 2018-12-19 RX ADMIN — PIPERACILLIN AND TAZOBACTAM 200 GRAM(S): 4; .5 INJECTION, POWDER, LYOPHILIZED, FOR SOLUTION INTRAVENOUS at 23:15

## 2018-12-19 RX ADMIN — Medication 4: at 01:48

## 2018-12-19 RX ADMIN — Medication 400 MILLIGRAM(S): at 00:46

## 2018-12-19 RX ADMIN — ARGATROBAN 3 MICROGRAM(S)/KG/MIN: 50 INJECTION, SOLUTION INTRAVENOUS at 05:29

## 2018-12-19 RX ADMIN — HEPARIN SODIUM 9 UNIT(S)/HR: 5000 INJECTION INTRAVENOUS; SUBCUTANEOUS at 09:34

## 2018-12-19 RX ADMIN — INSULIN GLARGINE 12 UNIT(S): 100 INJECTION, SOLUTION SUBCUTANEOUS at 23:16

## 2018-12-19 RX ADMIN — LEVETIRACETAM 400 MILLIGRAM(S): 250 TABLET, FILM COATED ORAL at 10:34

## 2018-12-19 NOTE — DIETITIAN INITIAL EVALUATION ADULT. - NS AS NUTRI INTERV ENTERAL NUTRITION
Concentration/Rate/Route/Insert enteral feeding tube/Composition/Feeding tube flush/If EN desired, recommend initiating Nepro@ 41mL/hr x 24hrs plus 1 ProStat (route per team) to provide: 984mL TV, 1871kcal, 95g pro, 715mL free H2O, 104% RDI, 1.48g/kg IBW protein. Recommend starting at 20mL/hr and advancing by [id number] to goal as tolerated. Additional free H2O per team. Monitor for s/s intolerance; maintain aspiration precautions at all times. Please follow VBF protocol once tolerating EN at goal for 24hrs w/o s/s intolerance./Volume/Schedule/Site care

## 2018-12-19 NOTE — PROGRESS NOTE ADULT - SUBJECTIVE AND OBJECTIVE BOX
BRIAN CHERRY             MRN-[id number]    CC: Cardiac arrest, GOC/AD, Support    HPI:  64 y/o M heavy smoker with a history of COPD, NIDDM, and HTN, presented to ED in cardiac arrest. He was seen by PMD Dr. Sonido Rollins this morning in [place] and had complained of progressively worsening lethargy and SOB for the past several weeks. As per a telephone conversation with Dr. Rollins, the patient appeared to be having difficulty breathing. Dr. Rollins instructed the patient to go to the ER, but the patient preferred to go to [facility]. He drove home and took a cab to [facility] with his wife. Per report, ED staff saw the  having difficulty helping the patient exit the cab outside of the ED and found the patient to be in cardiac arrest. CPR initiated on the street; presenting rhythm asystole.     Patient arrived in the resuscitation room with ongoing compressions at 11:50, with ROSC ultimately obtained at 12:26. At one point, the rhythm was believed to be VTach, and one shock delivered. Patient intubated during the code and started on levophed for pressor support. Found to have R pneumothorax on CXR; chest tube placed by CT Surgery. Given epi x4, bicarb, magnesium, solumedrol during code. Ordered for ativan 1mg IVP, keprra 1g IV, versed 5mg IVP, propofol, 2L NS bolus post code. Cardiology consulted in ED; low suspicion for ACS event upon review of EKG, bedside echo. Formal echocardiogram revealed dilated RV with decreased right sided function. Patient started on heparin gtt. MICU consulted for ICU placement.    SUBJECTIVE: Saw and evaluated Mr Cherry at bedside today. He remains on vent support sedated and with vasopressor support. Wife not at bedside, spent the night and due to return later today. Friend at bedside has been providing support to the wife and the patient's sister who arrived yesterday to the bedside.     ROS:  Unable to attain due to:  Medical condition  Dyspnea (Jose 0-10):  2 Mechanically vented                      N/V (Y/N): Unable to assess                             Secretions (Y/N) :  No  Agitation(Y/N): No  Pain (Y/N): MANDEEP pain scale: 2  -Provocation/Palliation: Unable to assess       -Quality/Quantity: Unable to assess       -Radiating: Unable to assess       -Severity: No pain  -Timing/Frequency: Unable to assess       -Impact on ADLs: Unable to assess         OTHER REVIEW OF SYSTEMS UNABLE TO OBTAIN  due to: medical condition    PEx:  T(C): 39.1 (18 @ 06:45), Max: 39.1 (18 @ 06:45)  HR: 100 (18 @ 11:00) (62 - 902)  BP: 116/69 (18 @ 11:00) (98/57 - 162/75)  RR: 20 (18 @ 11:00) (16 - 30)  SpO2: 97% (18 @ 11:00) (91% - 100%)  Wt(kg): 80    General: Elderly overweight man intubated sedated unresponsive to painful stimulus.   HEENT: Fixed gaze Pupils sluggish  ETT+  NGT+ dark discharge  Neck: Supple   CVS: Tachycardic S1S2 Distant heart sounds  Resp: Mechanically vented Not overbrething the vent Good air entry B/L anteriorly  GI: Globose Soft Distended BS+  :  Normal  Musc:  Improved myoclonus  A-line+. No C/C  LLE iO line  Neuro: Sedated   Psych: Unresponsive   Skin: Non jaundiced  Lymph: Edema -	     ALLERGIES: No Known Allergies    OPIATE NAÏVE (Y/N): Yes    MEDICATIONS: REVIEWED  MEDICATIONS  (STANDING):  acetaminophen    Suspension .. 1000 milliGRAM(s) Oral once  ALBUTerol/ipratropium for Nebulization 3 milliLiter(s) Nebulizer every 4 hours  chlorhexidine 0.12% Liquid 15 milliLiter(s) Oral Mucosa every 12 hours  chlorhexidine 2% Cloths 1 Application(s) Topical <User Schedule>  heparin  Infusion 900 Unit(s)/Hr (9 mL/Hr) IV Continuous <Continuous>  hydrocortisone sodium succinate Injectable 50 milliGRAM(s) IV Push every 6 hours  insulin lispro (HumaLOG) corrective regimen sliding scale   SubCutaneous every 6 hours  levETIRAcetam  IVPB 750 milliGRAM(s) IV Intermittent every 12 hours  levothyroxine Injectable 25 MICROGram(s) IV Push at bedtime  norepinephrine Infusion 0.05 MICROgram(s)/kG/Min (3.75 mL/Hr) IV Continuous <Continuous>  pantoprazole  Injectable 40 milliGRAM(s) IV Push daily  piperacillin/tazobactam IVPB. 3.375 Gram(s) IV Intermittent every 6 hours  propofol Infusion 5 MICROgram(s)/kG/Min (2.4 mL/Hr) IV Continuous <Continuous>  vasopressin Infusion 0.04 Unit(s)/Min (2.4 mL/Hr) IV Continuous <Continuous>    MEDICATIONS  (PRN):  dextrose 40% Gel 15 Gram(s) Oral once PRN Blood Glucose LESS THAN 70 milliGRAM(s)/deciliter  glucagon  Injectable 1 milliGRAM(s) IntraMuscular once PRN Glucose LESS THAN 70 milligrams/deciliter    LABS: REVIEWED  CBC:                        14.3   16.0  )-----------( 91       ( 19 Dec 2018 07:11 )             45.9     CMP:        139  |  94<L>  |  60<H>  ----------------------------<  343<H>  4.4   |  23  |  3.52<H>    Ca    7.3<L>      19 Dec 2018 07:11  Phos  6.5       Mg     2.4         TPro  5.0<L>  /  Alb  2.6<L>  /  TBili  0.5  /  DBili  x   /  AST  367<H>  /  ALT  243<H>  /  AlkPhos  84      POCT Blood Glucose.: 332 mg/dL (18 @ 11:33)    Urine Microscopic-Add On (NC) (18 @ 09:51)    Red Blood Cell - Urine: Many /HPF    White Blood Cell - Urine: < 5 /HPF    Comment - Urine: Moderate Amorphous Precipitates    Epithelial Cells: 0-5: Occasional: <3 /HPF  Few: 3-10 /HPF  Mod: 10-30 /HPF  Many: >30 /HPF /HPF    Bacteria: Present /HPF    Blood Gas Profile - Arterial in AM (18 @ 07:19)    pH, Arterial: 7.33    pCO2, Arterial: 51 mmHg    pO2, Arterial: 85 mmHg    HCO3, Arterial: 26 mmol/L    Base Excess, Arterial: -0.4 mmol/L    Oxygen Saturation, Arterial: 96 %    Haptoglobin, Serum: SEE NOTE: SPECIMEN IS HEMOLYZED mg/dL (18 @ 07:11)    Lactate Dehydrogenase, Serum: SEE NOTE: SPECIMEN IS HEMOLYZED U/L (12..18 @ 07:11)    Lactate, Blood: 2.8 mmoL/L (12.19.18 @ 07:10)  Lactate, Blood: 2.6 mmoL/L (12.19.18 @ 00:48)  Lactate, Blood: 2.8 mmoL/L (12.18.18 @ 16:46)  Lactate, Blood: 4.6 mmoL/L (12.18.18 @ 14:16)    Reticulocyte Count (..18 @ 07:11)    RBC Count: 4.87 M/uL    Reticulocyte Percent: 2.4: Reference ranges updated as of 2017. %    Thyroid Stimulating Hormone, Serum: 0.776 uIU/mL (12.18.18 @ 19:46)    Troponin T, Serum: 0.10 ng/mL (12.18.18 @ 19:46)  Troponin T, Serum: 0.12 ng/mL (12.18.18 @ 16:47)  Troponin T, Serum: 0.04  ng/mL (12.18.18 @ 12:39)    CKMB Units: 16.4 ng/mL (12.18.18 @ 19:46)  CKMB Units: 16.8 ng/mL (12.18.18 @ 16:47)    Creatine Kinase, Serum: 637 U/L (12.18.18 @ 19:46)  Creatine Kinase, Serum: 601 U/L (12.18.18 @ 16:47)    Hemoglobin A1C, Whole Blood: 8.2 % (12.18.18 @ 16:47)    IMAGING: REVIEWED    EXAM:  XR CHEST PORTABLE URGENT 1V                        PROCEDURE DATE:  2018      INTERPRETATION:  Resident preliminary report  AP Portable CXR dated 2018 6:20 AM  CLINICAL INFORMATION: NG tube placement  PRIOR STUDIES: Chest radiograph dated 2018 5:45 AM  FINDINGS: The apex of the chest is not included. Appropriate positioning   enteric tube. Right chest tube and partially visualized right IJ central   venous catheter are unchanged. There is pulmonary congestion. The   cardiovascular silhouette is stable.  IMPRESSION: Appropriate positioning enteric tube.    EEG REPORT:   Inpatient Continuous Video-Electroencephalography Report DAY 1  Patient Name:	BRIAN CHERRY    :	1955  MRN:	[id number]  Referred by: Megha Bee MD  Brief Clinical History:  BRIAN CHERRY is a 63 year old Male s/p PEA arrest Dec 18, 2018 likely 2/2 pulmonary embolus.  In coma, vEEG for prognosis and to rule out non-convulsive seizures.  Diagnosis Code:   R56.9 convulsions/seizure  CPT: 41512 vEEG 12-24 hours  Pertinent Medications on Initiation  ALBUTerol/ipratropium for Nebulization 3 milliLiter(s) Nebulizer every 4 hours  chlorhexidine 0.12% Liquid 15 milliLiter(s) Oral Mucosa every 12 hours  chlorhexidine 2% Cloths 1 Application(s) Topical <User Schedule>  heparin  Infusion 1400 Unit(s)/Hr (14 mL/Hr) IV Continuous <Continuous>  norepinephrine Infusion 0.05 MICROgram(s)/kG/Min (3.75 mL/Hr) IV Continuous <Continuous>  propofol Infusion 5 MICROgram(s)/kG/Min (2.4 mL/Hr) IV Continuous <Continuous>  sodium chloride 0.9%. 1000 milliLiter(s) (125 mL/Hr) IV Continuous <Continuous>  vasopressin Infusion 0.04 Unit(s)/Min (2.4 mL/Hr) IV Continuous <Continuous>  Acquisition Details:  Electroencephalography was acquired using a minimum of 21 channels on an [employer] system v 8.1.1 with electrode placement according to the standard International 10-20 system following ACNS (American Clinical Neurophysiology Society) guidelines for Long-Term Video EEG monitoring.  Anterior temporal T1 and T2 electrodes were utilized whenever possible.   The [employer] automated spike & seizure detections were all reviewed in detail, in addition to extensive portions of raw EEG.  Day 1: 2018 @ 6:26:41 PM to next morning @ 07:00am  Background:  suppression-burst (50+%), with 10:1 ratio initially, bursts primarily of muscle artifact without clear epileptiform activity within.  There was improvement in the background over the course of the recording, with bursts lasting over 10 seconds, with disorganized diffuse beta-alpha, then return to suppression.  Symmetry:  No persistent asymmetries of voltage or frequency.  Posterior Dominant Rhythm: absent  Organization: absent  Voltage:  Normal (20+ uV) during bursts  Variability: Yes.  Reactivity: Unclear.  N2 sleep: absent.  Spontaneous Activity:  No epileptiform discharges.  Periodic/rhythmic activity:  None.  Events:  No electrographic seizures or significant clinical events.  Provocations:  Hyperventilation and Photic stimulation: was not performed.  Daily Summary:  Initial truncal myoclonus appeared to settle through the recording.  There was no clear epileptiform activity associated with myoclonus, and it is unclear if these were of cerebral or more peripheral origin.  Burst suppression pattern is an indicator of severe cerebral dysfunction, though may also reflect effects of heavily sedating medications such as propofol..There was more admixed cerebral activity between the suppressed periods as the study progressed, but remained in a burst-suppression pattern until the end of this day.  Read by:  Rolando Bowie MD    Advanced Directives:     DNR    Decision maker: The patient does not have capacity for complex medical decision making given medical condition.  Legal surrogate: No documented HCP or living will. Patient's wife Jenniffer Cherry [phone]    GOALS OF CARE DISCUSSION       Palliative care info/counseling provided	           Family meeting at bedside.       Advanced Directives addressed please see Advance Care Planning Note	           Documentation of GOC: DNR, but continue current level of care ie pressors, intubation, and abx. Wife decided on not have the patient resuscitated again if he passes.           REFERRALS	        Unit SW/Case Mgmt        - Following

## 2018-12-19 NOTE — PROGRESS NOTE ADULT - SUBJECTIVE AND OBJECTIVE BOX
SUBJECTIVE:  Patient seen and examined at bedside. Febrile overnight to 102.2F, started on broad spectrum antibiotics, continues to require vasopressors for bp support.     MEDICATIONS:  acetaminophen    Suspension .. 1000 milliGRAM(s) Oral once  ALBUTerol/ipratropium for Nebulization 3 milliLiter(s) Nebulizer every 4 hours  chlorhexidine 0.12% Liquid 15 milliLiter(s) Oral Mucosa every 12 hours  chlorhexidine 2% Cloths 1 Application(s) Topical <User Schedule>  dextrose 40% Gel 15 Gram(s) Oral once PRN  dextrose 5%. 1000 milliLiter(s) IV Continuous <Continuous>  dextrose 50% Injectable 12.5 Gram(s) IV Push once  dextrose 50% Injectable 25 Gram(s) IV Push once  dextrose 50% Injectable 25 Gram(s) IV Push once  glucagon  Injectable 1 milliGRAM(s) IntraMuscular once PRN  heparin  Infusion 900 Unit(s)/Hr IV Continuous <Continuous>  hydrocortisone sodium succinate Injectable 50 milliGRAM(s) IV Push every 6 hours  insulin lispro (HumaLOG) corrective regimen sliding scale   SubCutaneous every 6 hours  levETIRAcetam  IVPB 750 milliGRAM(s) IV Intermittent every 12 hours  levothyroxine Injectable 25 MICROGram(s) IV Push at bedtime  norepinephrine Infusion 0.05 MICROgram(s)/kG/Min IV Continuous <Continuous>  pantoprazole  Injectable 40 milliGRAM(s) IV Push daily  piperacillin/tazobactam IVPB. 3.375 Gram(s) IV Intermittent every 6 hours  propofol Infusion 5 MICROgram(s)/kG/Min IV Continuous <Continuous>  vasopressin Infusion 0.04 Unit(s)/Min IV Continuous <Continuous>  	    PHYSICAL EXAM:  T(C): 36.5 (12-19-18 @ 15:06), Max: 39.1 (12-19-18 @ 06:45)  HR: 76 (12-19-18 @ 15:06) (62 - 902)  BP: 120/70 (12-19-18 @ 13:00) (98/57 - 162/75)  RR: 20 (12-19-18 @ 15:06) (10 - 25)  SpO2: 96% (12-19-18 @ 15:06) (91% - 100%)  Wt(kg): --    GEN: Unresponsive, intubated  Neck: No JVD  Cardiovascular: Regular rate and rhythm, +S1 S2. No murmurs, rubs, or gallops appreciated  Respiratory: Good airway entry bilaterally  Gastrointestinal:  Soft, non-tender, non-distended  Neurologic: Unresponsive   Extremities: Warm, well-perfused extremities. 1+ edema bilaterally   Vascular: Radial pulses 2+ bilaterally      I&O's Summary    18 Dec 2018 07:01  -  19 Dec 2018 07:00  --------------------------------------------------------  IN: 3312.2 mL / OUT: 280 mL / NET: 3032.2 mL    19 Dec 2018 07:01  -  19 Dec 2018 15:25  --------------------------------------------------------  IN: 385.2 mL / OUT: 150 mL / NET: 235.2 mL      Height (cm): 168 (12-19 @ 00:33)  Weight (kg): 100 (12-18 @ 16:21)  BMI (kg/m2): 35.4 (12-19 @ 00:33)  BSA (m2): 2.09 (12-19 @ 00:33)      LABS:	 	    CARDIAC MARKERS:                        14.3   16.0  )-----------( 91       ( 19 Dec 2018 07:11 )             45.9     12-19    139  |  94<L>  |  60<H>  ----------------------------<  343<H>  4.4   |  23  |  3.52<H>    Ca    7.3<L>      19 Dec 2018 07:11  Phos  6.5     12-19  Mg     2.4     12-19    TPro  5.0<L>  /  Alb  2.6<L>  /  TBili  0.5  /  DBili  x   /  AST  367<H>  /  ALT  243<H>  /  AlkPhos  84  12-19    HgA1c: Hemoglobin A1C, Whole Blood: 8.2 % (12-18 @ 16:47)    TSH: Thyroid Stimulating Hormone, Serum: 0.776 uIU/mL (12-18 @ 19:46)    CARDIAC DIAGNOSTIC TESTING:  ECG: NSR. RBBB. Non-specific ST-T wave changes laterally    TELEMETRY: Tachycardia     ECHO:  Interpretation Summary  Limited poor quality study. The left atrial size is normal. Right atrium   not well visualized.The aortic valve is not well visualized. No aortic  regurgitation noted.Structurally normal mitral valve. No mitral   regurgitation noted.Structurally normal tricuspid valve. There is mild tricuspid  regurgitation.The pulmonary artery systolic pressure is estimated to be   45 mmHg.The pulmonic valve is not well visualized.The right ventricle is   not well visualized. RV is probably dilated with reduced function. [place] appears  hyperkinetic suggestive of right heart strain.   There is mild   concentric left ventricular hypertrophy. Grossly normal LV function.  No aortic root  dilatation.There is no pericardial effusion.Results were communicated to   the team.    RADIOLOGY:  CXR:  FINDINGS: The apex of the chest is not included. Appropriate positioning   enteric tube. Right chest tube and partially visualized right IJ central   venous catheter are unchanged. There is pulmonary congestion. The   cardiovascular silhouette is stable.      IMPRESSION: Appropriate positioning enteric tube.      ASSESSMENT/PLAN:  Mr. Wei is a 63 year old man with a past medical history significant for asthma/COPD, diabetes mellitus, hypertension who presented with an asystolic cardiac arrest with ROSC after about 30 minutes, complicated by right pneumothorax status post chest tube placement. EKG showed no acute ischemic changes but is concerning for right heart strain given RBBB with repolarization abnormalities. Troponins mildly elevated likely in the setting of chest compressions, already downtrended. Echocardiogram showed grossly normal LV function but concern for RV dilatation and decreased function. Unclear etiology of cardiac arrest, differentials include pulmonary embolism (empiric treatment with heparin drip), other intrapulmonary process. The patient was febrile last night, possible aspiration pneumonia, started on broad spectrum antibiotics.   - Repeat echocardiogram with definity contrast when hemodynamically stable  - No DAPT at this time given no signs of ACS  - Agree with heparin drip, pending evaluation for PE once patient is more hemodynamically stable  - Mildly hypervolemic on exam, but would hold off on diuresis to maintain preload in the setting of RV dilatation and dysfunction  - Continues to require levophed and vasopressin     Jackie Houser  Cardiology Fellow

## 2018-12-19 NOTE — PROGRESS NOTE ADULT - ASSESSMENT
62 yo man with history of asthma, T2DM, and HTN who presents in cardiac arrest. Patient was found outside hospital in a taxi by ED nurse and CPR was initiated on the street. In ED, patient was in asystole, ACLS protocol began ROSC ~30min.

## 2018-12-19 NOTE — PROGRESS NOTE ADULT - ASSESSMENT
62 y/o M heavy smoker with a history of COPD, NIDDM, and HTN, admitted to MICU post PEA arrest, ROSC achieved after 30 minutes. Course complicated by R pneumothorax s/p chest tube. Now on heparin gtt for presumed PE.    Cardiovascular  #PEA arrest with ROSC. Likely 2/2 pulmonary embolus. Less likely COPD exacerbation given the history and physical. Differential also includes ACS, although troponin 0.04 so less likely. Patient presents with pneumothorax, but this is likely iatrogenic due to chest compressions.  - Continue with heparin gtt for suspected PE  - Consider CTA PE when patient is clinically stable  - LE duplex to r/o DVT  - TTE showing RV dilation with reduced function, hyperkinetic apex consistent with R heart strain, and mild concentric LVH  - maintain MAP > 65; currently on levophed gtt + vasopressin gtt.  - palliative consulted in ED; f/u ongoing GOC discussion    #Hemodynamics  - Hemodynamically unstable on levophed    Pulmonary  #Acute hypoxic respiratory failure secondary to cardiac arrest.  - Continue on mechanical ventilation at this time    #r/o massive PE  - EKG showing RBBB; Twave in V2-V4; biphasic p waves in II and III  - TTE with evidence of R heart strain  - Continue heparin ggt  - Consider CTa PE when patient  more stable    Renal  #ANGELY. Likely due to prerenal in the setting of hypoperfusion due to cardiac arrest.  - Trend BMP.  - Urine electrolytes if renal function continues to worsen.  - Avoid nephrotoxic meds    F: No IVF indicated at this time  E: replete electrolytes prn  N: NPO    Dispo: MICU for post-cardiac arrest monitoring.  Code: DNR 64 y/o M heavy smoker with a history of COPD, NIDDM, and HTN, admitted to MICU post PEA arrest, ROSC achieved after 30 minutes. Course complicated by R pneumothorax s/p chest tube. Now on heparin gtt for presumed PE.    Cardiovascular  #PEA arrest with ROSC likely 2/2 pulmonary embolus  - s/p heparin gtt, now on argatroban gtt for suspected PE  - Consider CTA PE when patient is clinically stable  - LE duplex to r/o DVT  - TTE showing RV dilation with reduced function, hyperkinetic apex consistent with R heart strain, and mild concentric LVH  - maintain MAP > 65; currently on levophed gtt + vasopressin gtt.  - palliative following    #Hemodynamics  - Hemodynamically unstable on levophed  - Febrile overnight    Pulmonary  #Acute hypoxic respiratory failure secondary to cardiac arrest  - AC/CMV    #r/o massive PE  - EKG showing RBBB; Twave in V2-V4; biphasic p waves in II and III  - TTE with evidence of R heart strain  - Argatroban gtt    #Pneumothorax  - likely iatrogenic 2/2 compressions vs chest tube    #r/o aspiration pneumonia  - Febrile overnight possibly in setting of aspiration during cardiac arrest  - CXR still without evidence of new consolidation may be lagging from clinical picture  - Vancomycin and Zosyn (12/19/18-?)    Heme  #r/o HIT  - Four T score 5 with 14% risk of HIT. Platelets reduced by over 50% over 1 day  - Discontinue heparin gtt and began argatroban gtt    Renal  #Pre-renal ANGELY  - FeNa 0.4%  - s/p 2L NS fluid resuscitation with additional 1L NS overnight  - Avoid nephrotoxic meds    F: No IVF  E: Replete electrolytes PRN  N: NPO    Dispo: MICU  Code: DNR 62 y/o M heavy smoker with a history of COPD, NIDDM, and HTN, admitted to MICU post PEA arrest, ROSC achieved after 30 minutes. Course complicated by R pneumothorax s/p chest tube. Now on heparin gtt for presumed PE.    Cardiovascular  #PEA arrest with ROSC likely 2/2 pulmonary embolus  - heparin gtt for suspected PE  - Consider CTA PE when patient is clinically stable  - LE duplex to r/o DVT  - TTE showing RV dilation with reduced function, hyperkinetic apex consistent with R heart strain, and mild concentric LVH  - Repeat TTE with EF 60-65%, mild RV reduced function. No prior Echo at PMD office  - maintain MAP > 65; currently on levophed gtt + vasopressin gtt.  - palliative following    #Hemodynamics  - Hemodynamically unstable on levophed and vasopressin  - Febrile overnight    Pulmonary  #Acute hypoxic respiratory failure secondary to cardiac arrest  - AC/CMV    #r/o massive PE  - EKG showing RBBB; Twave in V2-V4; biphasic p waves in II and III  - TTE with evidence of R heart strain  - Argatroban gtt    #Pneumothorax  - likely iatrogenic 2/2 compressions vs chest tube    #r/o aspiration pneumonia  - Febrile overnight possibly in setting of aspiration during cardiac arrest  - CXR still without evidence of new consolidation may be lagging from clinical picture  - Vancomycin and Zosyn (12/19/18-?)    Heme  #Thombocytopenia  - Four T score 4 to 5 with 14% risk of HIT. Platelets reduced by around 50% over 1 day - however no prior history of heparin use and no recent hospitalizations, unlikely to be HIT soon after heparin started  - Continue to monitor platelets    Renal  #Pre-renal ANGELY  - FeNa 0.4% likely 2/2 hypoperfusion  - Adequate UOP  - s/p 2L NS fluid resuscitation with additional 1L NS overnight  - Avoid nephrotoxic meds    Neurologic  #Possible anoxic brain injury  - On presentation with myoclonic seizures no longer present but on propofol  - Continue broad spectrum antibiotics vancomycin (dosed by level) and zosyn (12/19/18-?)    F: No IVF  E: Replete electrolytes PRN  N: NPO    Dispo: MICU  Code: DNR

## 2018-12-19 NOTE — DIETITIAN INITIAL EVALUATION ADULT. - ENERGY NEEDS
Height 66"; .5#; #; 155%IBW  BMI 35.4  Ideal body weight used for calculations as pt >120% of IBW. Needs adjusted for vent; fluids per team

## 2018-12-19 NOTE — PROGRESS NOTE ADULT - PROBLEM SELECTOR PLAN 3
Wife decided yesterday to not pursue resuscitation, but would like to continue current level of care. Ongoing support being provided.

## 2018-12-19 NOTE — DIETITIAN INITIAL EVALUATION ADULT. - OTHER INFO
64 yo/male with PMHx HTN, DM, COPD, current smoker, presented to pulmonologist office with lethargy and SOB, then sent to ED where he had PEA arrest, now thought likely to be d/t pulmonary embolism. S/p ROSC after 30 minutes; intubated for airway protection. C/b R. pneumothorax, s/p chest tube placement. Pt seen in room, intubated on VC/AC mode, off of sedation at this time. MAP 91, levophed and vasopressin gtts for BP support. OGT placed to LIWS; bloody/bilious output. Unable to obtain nutrition history at this time, no family present. NKFA. Skin intact pressure-wise. Palliative consulted; pending GOC discussion. Will keep nutrition aligned with GOC at all times.

## 2018-12-19 NOTE — PROGRESS NOTE ADULT - PROBLEM SELECTOR PLAN 1
Continues to require vent support and sedation. Continues on vasopressors.   Management as per MICU team.

## 2018-12-19 NOTE — PROGRESS NOTE ADULT - SUBJECTIVE AND OBJECTIVE BOX
NOTE INCOMPLETE****    OVERNIGHT EVENTS:    SUBJECTIVE / INTERVAL HPI: Patient seen and examined at bedside.     VITAL SIGNS:  Vital Signs Last 24 Hrs  T(C): 38.7 (19 Dec 2018 01:30), Max: 39 (18 Dec 2018 23:05)  T(F): 101.7 (19 Dec 2018 01:30), Max: 102.2 (18 Dec 2018 23:05)  HR: 78 (19 Dec 2018 06:00) (0 - 902)  BP: 110/62 (19 Dec 2018 05:00) (0/0 - 162/75)  BP(mean): 76 (19 Dec 2018 05:00) (72 - 109)  RR: 20 (19 Dec 2018 06:00) (0 - 30)  SpO2: 97% (19 Dec 2018 06:00) (91% - 100%)    PHYSICAL EXAM:    GEN: Unresponsive, intubated  Neck: No JVD  Cardiovascular: Regular rate and rhythm, +S1 S2. No murmurs, rubs, or gallops appreciated  Respiratory: Good airway entry bilaterally  Gastrointestinal:  Soft, non-tender, non-distended  Neurologic: Unresponsive   Extremities: Warm, well-perfused extremities. 1+ edema bilaterally   Vascular: Radial pulses 2+ bilaterally    MEDICATIONS:  MEDICATIONS  (STANDING):  ALBUTerol/ipratropium for Nebulization 3 milliLiter(s) Nebulizer every 4 hours  argatroban Infusion 0.5 MICROgram(s)/kG/Min (3 mL/Hr) IV Continuous <Continuous>  chlorhexidine 0.12% Liquid 15 milliLiter(s) Oral Mucosa every 12 hours  chlorhexidine 2% Cloths 1 Application(s) Topical <User Schedule>  dextrose 5%. 1000 milliLiter(s) (50 mL/Hr) IV Continuous <Continuous>  dextrose 50% Injectable 12.5 Gram(s) IV Push once  dextrose 50% Injectable 25 Gram(s) IV Push once  dextrose 50% Injectable 25 Gram(s) IV Push once  insulin lispro (HumaLOG) corrective regimen sliding scale   SubCutaneous every 6 hours  levothyroxine Injectable 25 MICROGram(s) IV Push at bedtime  norepinephrine Infusion 0.05 MICROgram(s)/kG/Min (3.75 mL/Hr) IV Continuous <Continuous>  pantoprazole  Injectable 40 milliGRAM(s) IV Push daily  piperacillin/tazobactam IVPB. 3.375 Gram(s) IV Intermittent every 6 hours  propofol Infusion 5 MICROgram(s)/kG/Min (2.4 mL/Hr) IV Continuous <Continuous>  vancomycin  IVPB 1250 milliGRAM(s) IV Intermittent every 24 hours  vasopressin Infusion 0.04 Unit(s)/Min (2.4 mL/Hr) IV Continuous <Continuous>    MEDICATIONS  (PRN):  dextrose 40% Gel 15 Gram(s) Oral once PRN Blood Glucose LESS THAN 70 milliGRAM(s)/deciliter  glucagon  Injectable 1 milliGRAM(s) IntraMuscular once PRN Glucose LESS THAN 70 milligrams/deciliter      ALLERGIES:  Allergies    No Known Allergies    Intolerances        LABS:                        15.7   19.8  )-----------( 88       ( 19 Dec 2018 00:48 )             50.6     12-19    141  |  98  |  53<H>  ----------------------------<  294<H>  5.1   |  23  |  2.51<H>    Ca    8.1<L>      19 Dec 2018 00:48  Phos  6.7     12-19  Mg     2.3     12-19    TPro  5.0<L>  /  Alb  2.6<L>  /  TBili  0.5  /  DBili  x   /  AST  367<H>  /  ALT  243<H>  /  AlkPhos  84  12-19    PT/INR - ( 18 Dec 2018 12:38 )   PT: 12.1 sec;   INR: 1.07          PTT - ( 19 Dec 2018 00:48 )  PTT:139.5 sec    CAPILLARY BLOOD GLUCOSE      POCT Blood Glucose.: 219 mg/dL (18 Dec 2018 23:43)    RADIOLOGY & ADDITIONAL TESTS: Reviewed. OVERNIGHT EVENTS: Febrile to 102.2 rectal o/n, started on vanc/zosyn    SUBJECTIVE / INTERVAL HPI: Patient seen and examined at bedside. Intubated, sedated. ROS unable to obtain.     VITAL SIGNS:  Vital Signs Last 24 Hrs  T(C): 38.7 (19 Dec 2018 01:30), Max: 39 (18 Dec 2018 23:05)  T(F): 101.7 (19 Dec 2018 01:30), Max: 102.2 (18 Dec 2018 23:05)  HR: 78 (19 Dec 2018 06:00) (0 - 902)  BP: 110/62 (19 Dec 2018 05:00) (0/0 - 162/75)  BP(mean): 76 (19 Dec 2018 05:00) (72 - 109)  RR: 20 (19 Dec 2018 06:00) (0 - 30)  SpO2: 97% (19 Dec 2018 06:00) (91% - 100%)    PHYSICAL EXAM:    GEN: Obese male, intubated, sedated  ENT: MMM, dry blood at perioral area, no active bleeding within mouth  Neck: No JVD  Cardiovascular: Regular rate and rhythm, +S1 S2. No murmurs, rubs, or gallops  Respiratory: No retractions, CTAB, no wheezing  Gastrointestinal:  Soft, non-tender, non-distended, +BS  Extremities: Cold hands and feet, capillary refill <2sec; 1+ non-pitting LE edema to mid-calves   Vascular: Radial and pedal pulses 2+ bilaterally  Neurologic: AAOx0, unable to assess; intubated and sedated    MEDICATIONS:  MEDICATIONS  (STANDING):  ALBUTerol/ipratropium for Nebulization 3 milliLiter(s) Nebulizer every 4 hours  argatroban Infusion 0.5 MICROgram(s)/kG/Min (3 mL/Hr) IV Continuous <Continuous>  chlorhexidine 0.12% Liquid 15 milliLiter(s) Oral Mucosa every 12 hours  chlorhexidine 2% Cloths 1 Application(s) Topical <User Schedule>  dextrose 5%. 1000 milliLiter(s) (50 mL/Hr) IV Continuous <Continuous>  dextrose 50% Injectable 12.5 Gram(s) IV Push once  dextrose 50% Injectable 25 Gram(s) IV Push once  dextrose 50% Injectable 25 Gram(s) IV Push once  insulin lispro (HumaLOG) corrective regimen sliding scale   SubCutaneous every 6 hours  levothyroxine Injectable 25 MICROGram(s) IV Push at bedtime  norepinephrine Infusion 0.05 MICROgram(s)/kG/Min (3.75 mL/Hr) IV Continuous <Continuous>  pantoprazole  Injectable 40 milliGRAM(s) IV Push daily  piperacillin/tazobactam IVPB. 3.375 Gram(s) IV Intermittent every 6 hours  propofol Infusion 5 MICROgram(s)/kG/Min (2.4 mL/Hr) IV Continuous <Continuous>  vancomycin  IVPB 1250 milliGRAM(s) IV Intermittent every 24 hours  vasopressin Infusion 0.04 Unit(s)/Min (2.4 mL/Hr) IV Continuous <Continuous>    MEDICATIONS  (PRN):  dextrose 40% Gel 15 Gram(s) Oral once PRN Blood Glucose LESS THAN 70 milliGRAM(s)/deciliter  glucagon  Injectable 1 milliGRAM(s) IntraMuscular once PRN Glucose LESS THAN 70 milligrams/deciliter      ALLERGIES:  Allergies    No Known Allergies    Intolerances        LABS:                        15.7   19.8  )-----------( 88       ( 19 Dec 2018 00:48 )             50.6     12-19    141  |  98  |  53<H>  ----------------------------<  294<H>  5.1   |  23  |  2.51<H>    Ca    8.1<L>      19 Dec 2018 00:48  Phos  6.7     12-19  Mg     2.3     12-19    TPro  5.0<L>  /  Alb  2.6<L>  /  TBili  0.5  /  DBili  x   /  AST  367<H>  /  ALT  243<H>  /  AlkPhos  84  12-19    PT/INR - ( 18 Dec 2018 12:38 )   PT: 12.1 sec;   INR: 1.07          PTT - ( 19 Dec 2018 00:48 )  PTT:139.5 sec    CAPILLARY BLOOD GLUCOSE      POCT Blood Glucose.: 219 mg/dL (18 Dec 2018 23:43)    RADIOLOGY & ADDITIONAL TESTS: Reviewed.

## 2018-12-19 NOTE — PROGRESS NOTE ADULT - ATTENDING COMMENTS
Patient seen and examined with house-staff during bedside rounds.  Resident note read, including vitals, physical findings, laboratory data, and radiological reports.   Revisions included below.  Direct personal management at bed side and extensive interpretation of the data.  Plan was outlined and discussed in details with the housestaff.  Decision making of high complexity  Action taken for acute disease activity to reflect the level of care provided:  - medication reconciliation  - review laboratory data  respiratory failure s/p cardiac arrest possible anoxic encephalopathy, tonic colonic seizure, copd shock, ATI, shock liver, shock, PTX with bronchopleural fistula, and thrombocytopenia  - gas exchange improved  - continue pressors  - continue CT and still has air leak  - monitor lung compliance  - SvO2 is consistent with sepsis picture and adequate cardiac output  - continue bronchodilators  - continue antibiotics  - DC argatroban and restart heparin  - palliative care  - EEG  - worsening renal function but he has adequate UO  - adjust vancomycin to renal function Patient seen and examined with house-staff during bedside rounds.  Resident note read, including vitals, physical findings, laboratory data, and radiological reports.   Revisions included below.  Direct personal management at bed side and extensive interpretation of the data.  Plan was outlined and discussed in details with the housestaff.  Decision making of high complexity  Action taken for acute disease activity to reflect the level of care provided:  - medication reconciliation  - review laboratory data  respiratory failure s/p cardiac arrest possible anoxic encephalopathy, tonic colonic seizure, copd shock, ATI, shock liver, shock, PTX with bronchopleural fistula, and thrombocytopenia, possible PE  - gas exchange improved  - continue pressors  - continue CT and still has air leak  - monitor lung compliance  - SvO2 is consistent with sepsis picture and adequate cardiac output  - continue bronchodilators  - continue antibiotics  - DC argatroban and restart heparin  - palliative care  - EEG  - worsening renal function but he has adequate UO  - adjust vancomycin to renal function Patient seen and examined with house-staff during bedside rounds.  Resident note read, including vitals, physical findings, laboratory data, and radiological reports.   Revisions included below.  Direct personal management at bed side and extensive interpretation of the data.  Plan was outlined and discussed in details with the housestaff.  Decision making of high complexity  Action taken for acute disease activity to reflect the level of care provided:  - medication reconciliation  - review laboratory data  respiratory failure s/p cardiac arrest possible anoxic encephalopathy, tonic colonic seizure, copd shock, ATI, shock liver, shock, PTX with bronchopleural fistula, and thrombocytopenia, possible PE  - gas exchange improved  - continue pressors  - continue CT and still has air leak  - monitor lung compliance  - SvO2 is consistent with sepsis picture and adequate cardiac output  - continue bronchodilators  - continue antibiotics  - DC argatroban and restart heparin  - palliative care  - EEG  - worsening renal function but he has adequate UO  - adjust vancomycin to renal function  - no corneal, gag, cough reflex.  will call neuro for possible brain death criteria

## 2018-12-19 NOTE — PROGRESS NOTE ADULT - PROBLEM SELECTOR PLAN 6
Support provided to patient and family. Patient to have access to supportive services during rest of hospital stay as the patient/family deemed necessary ie. Chaplaincy, Massage therapy, Music therapy, Patient and family supportive services, Palliative SW, etc.    As discussed during the palliative IDT meeting and as identified during the patients PSSA screening the patient would benefit from patient and family support, chaplaincy, palliative SW.

## 2018-12-19 NOTE — CONSULT NOTE ADULT - ASSESSMENT
63 yoM with pmh of asthma/COPD, DM, hypertension who presented with an asystolic cardiac arrest. ROSC was achieved after 30 minutes. On examination patient with absent brainstem reflexes. Patient is now 24 hours post arrest. Would recommend   -obtaining CT head to evaluate for anoxic brain injury   - c/w VEEG monitoring   - MICU team to perform apnea testing tomorrow 63 yoM with pmh of asthma/COPD, DM, hypertension who presented with an asystolic cardiac arrest. ROSC was achieved after 30 minutes. On examination patient with absent brainstem reflexes. Patient is now 24 hours post arrest. Although exam at 48-72 hrs after arrest is more informative, the absence of all brainstem reflexes at this time, prolonged time to ROSC and myoclonic jerks all indicators of poor prognosis.   Would recommend   -obtaining CT head to evaluate for anoxic brain injury if / when able; after 24 hrs there should be some changes (loss of grey-white differentiation), highest yield if done 48-72 hrs after as cerebral edema would also be apparent  - c/w VEEG monitoring, f/u read by epilepsy team  - will re-evaluate clinically tomorrow for more accurate prognostication  - MICU team to perform apnea testing tomorrow 63 yoM with pmh of asthma/COPD, DM, hypertension who presented with an asystolic cardiac arrest. ROSC was achieved after 30 minutes. On examination patient with absent brainstem reflexes. Patient is now 24 hours post arrest. Although exam at 48-72 hrs after arrest is more informative, the absence of all brainstem reflexes at this time, prolonged time to ROSC and myoclonic jerks all indicators of poor prognosis.   Would recommend   -obtaining CT head to evaluate for anoxic brain injury if / when able; after 24 hrs there should be some changes (loss of grey-white differentiation), highest yield if done 48-72 hrs after as cerebral edema would also be apparent  - c/w VEEG monitoring, f/u read by epilepsy team  - will re-evaluate clinically tomorrow for more accurate prognostication  - MICU team to perform apnea testing tomorrow     Given acute kidney injury and decreased GFR please change keppra dosing to 500 BID

## 2018-12-19 NOTE — CHART NOTE - NSCHARTNOTEFT_GEN_A_CORE
Events and chart reviewed from past 24 hours.  CT surgery PA placed emergent pigtail last night for tension pneumothorax, which is now resolved.  Persistent 1/5 air leak.  Tube to be kept to suction at all times.  Pt is now DNR.  Following for pigtail management. Daily CXR while tube is in place.

## 2018-12-19 NOTE — EEG REPORT - NS EEG TEXT BOX
Inpatient Continuous Video-Electroencephalography Report DAY 1    Patient Name:	BRIAN CHERRY    :	1955  MRN:	[id number]  Referred by: Megha Bee MD    Brief Clinical History:  BRIAN CHERRY is a 63 year old Male s/p PEA arrest Dec 18, 2018 likely 2/2 pulmonary embolus.  In coma, vEEG for prognosis and to rule out non-convulsive seizures.    Diagnosis Code:   R56.9 convulsions/seizure  CPT: 93845 vEEG 12-24 hours    Pertinent Medications on Initiation  ALBUTerol/ipratropium for Nebulization 3 milliLiter(s) Nebulizer every 4 hours  chlorhexidine 0.12% Liquid 15 milliLiter(s) Oral Mucosa every 12 hours  chlorhexidine 2% Cloths 1 Application(s) Topical <User Schedule>  heparin  Infusion 1400 Unit(s)/Hr (14 mL/Hr) IV Continuous <Continuous>  norepinephrine Infusion 0.05 MICROgram(s)/kG/Min (3.75 mL/Hr) IV Continuous <Continuous>  propofol Infusion 5 MICROgram(s)/kG/Min (2.4 mL/Hr) IV Continuous <Continuous>  sodium chloride 0.9%. 1000 milliLiter(s) (125 mL/Hr) IV Continuous <Continuous>  vasopressin Infusion 0.04 Unit(s)/Min (2.4 mL/Hr) IV Continuous <Continuous>    Acquisition Details:  Electroencephalography was acquired using a minimum of 21 channels on an Shut Down Neurology system v 8.1.1 with electrode placement according to the standard International 10-20 system following ACNS (American Clinical Neurophysiology Society) guidelines for Long-Term Video EEG monitoring.  Anterior temporal T1 and T2 electrodes were utilized whenever possible.   The [employer] automated spike & seizure detections were all reviewed in detail, in addition to extensive portions of raw EEG.    Day 1: 2018 @ 6:26:41 PM to next morning @ 07:00am  Background:  suppression-burst (50+%), with 10:1 ratio initially, bursts primarily of muscle artifact without clear epileptiform activity within.  There was improvement in the background over the course of the recording, with bursts lasting over 10 seconds, with disorganized diffuse beta-alpha, then return to suppression.  Symmetry:  No persistent asymmetries of voltage or frequency.  Posterior Dominant Rhythm: absent  Organization: absent  Voltage:  Normal (20+ uV) during bursts  Variability: Yes. 			Reactivity: Unclear.  N2 sleep: absent.  Spontaneous Activity:  No epileptiform discharges.  Periodic/rhythmic activity:  None.  Events:  No electrographic seizures or significant clinical events.  Provocations:  Hyperventilation and Photic stimulation: was not performed.    Daily Summary:    Initial truncal myoclonus appeared to settle through the recording.  There was no clear epileptiform activity associated with myoclonus, and it is unclear if these were of cerebral or more peripheral origin.  Burst suppression pattern is an indicator of severe cerebral dysfunction, though may also reflect effects of heavily sedating medications such as propofol..There was more admixed cerebral activity between the suppressed periods as the study progressed, but remained in a burst-suppression pattern until the end of this day.  Read by:  Rolando Bowie MD

## 2018-12-19 NOTE — PROGRESS NOTE ADULT - ATTENDING COMMENTS
Assessment: Patient personally seen and examined myself during rounds with the House Staff/Fellow  ON DATE 12/19/18  House Staff/Fellow note read, including vitals, physical findings, laboratory data, and radiological reports.   Revisions included below.   Direct personal management at bed side and extensive interpretation of the data.    Plan was outlined and discussed in details with the House Staff/Fellow.    Decision making of high complexity   Risk high of complications, morbidity, and/or mortality  Assessment and Action taken for acute disease activity to reflect the level of care provided:  -Hemodynamic evaluation and support  -ACS assessment and treatment as applicable  -Heart failure assessment and treatment as applicable  -Cardiac Telemetry reviewed  -Medication reconciliation  -Review laboratory data  -EKG reviewed   -Echo reviewed  -Interdisciplinary discussion with IC / EP / HF / CTS teams as needed  My plan includes :  close hemodynamic monitoring and management   Monitor for arrhythmias and monitor parameters for organ perfusion  monitor neurologic status  Head of the bed should remain elevated to 45 deg .   chest PT and IS will be encouraged  monitor adequacy of oxygenation and ventilation and attempt to wean oxygen  Nutritional goals will be met using po eventually , ensure adequate caloric intake and montior the same  Stress ulcer and VTE prophylaxis will be achieved    Glycemic control is satisfactory  Electrolytes have been replete as necessary and wound care has been carried out. Pain control has been achieved.   aggressive physical therapy and early mobility and ambulation goals will be met   The family was updated about the course and plan  CRITICAL CARE TIME SPENT in evaluation and management, reassessments, review and interpretation of labs and x-rays, ventilator and hemodynamic management, formulating a plan and coordinating care: ___90____ MIN.  Time does not include procedural time.    Brenden Krueger MD  Mobile: [phone]

## 2018-12-19 NOTE — CHART NOTE - NSCHARTNOTEFT_GEN_A_CORE
Attempted to place OG tube. Tube was coiled in patient's mouth and bloody discharge was present. Started daily pantoprazole IV.   NG tube then successfully placed. Confirmed gastric positioning by clinical exam and CXR.

## 2018-12-19 NOTE — PROGRESS NOTE ADULT - PROBLEM SELECTOR PLAN 2
Remains sedated due to myoclonus. Prognosis is very poor. Ongoing EEG to assess extent of hypoxia.  Management as per Neurology and MICU.

## 2018-12-20 DIAGNOSIS — Z51.5 ENCOUNTER FOR PALLIATIVE CARE: ICD-10-CM

## 2018-12-20 LAB
ANION GAP SERPL CALC-SCNC: 22 MMOL/L — HIGH (ref 5–17)
APTT BLD: 62.8 SEC — HIGH (ref 27.5–36.3)
APTT BLD: 69.6 SEC — HIGH (ref 27.5–36.3)
BASE EXCESS BLDA CALC-SCNC: -2.6 MMOL/L — LOW (ref -2–3)
BUN SERPL-MCNC: 81 MG/DL — HIGH (ref 7–23)
CALCIUM SERPL-MCNC: 6.9 MG/DL — LOW (ref 8.4–10.5)
CHLORIDE SERPL-SCNC: 95 MMOL/L — LOW (ref 96–108)
CO2 SERPL-SCNC: 23 MMOL/L — SIGNIFICANT CHANGE UP (ref 22–31)
CREAT SERPL-MCNC: 5.69 MG/DL — HIGH (ref 0.5–1.3)
EXTRA BLUE TOP TUBE: SIGNIFICANT CHANGE UP
GLUCOSE BLDC GLUCOMTR-MCNC: 272 MG/DL — HIGH (ref 70–99)
GLUCOSE BLDC GLUCOMTR-MCNC: 360 MG/DL — HIGH (ref 70–99)
GLUCOSE BLDC GLUCOMTR-MCNC: 371 MG/DL — HIGH (ref 70–99)
GLUCOSE BLDC GLUCOMTR-MCNC: 408 MG/DL — HIGH (ref 70–99)
GLUCOSE BLDC GLUCOMTR-MCNC: 408 MG/DL — HIGH (ref 70–99)
GLUCOSE SERPL-MCNC: 454 MG/DL — CRITICAL HIGH (ref 70–99)
HCO3 BLDA-SCNC: 24 MMOL/L — SIGNIFICANT CHANGE UP (ref 21–28)
HCT VFR BLD CALC: 43.5 % — SIGNIFICANT CHANGE UP (ref 39–50)
HGB BLD-MCNC: 14 G/DL — SIGNIFICANT CHANGE UP (ref 13–17)
INR BLD: 1.03 — SIGNIFICANT CHANGE UP (ref 0.88–1.16)
MAGNESIUM SERPL-MCNC: 2.6 MG/DL — SIGNIFICANT CHANGE UP (ref 1.6–2.6)
MCHC RBC-ENTMCNC: 29.9 PG — SIGNIFICANT CHANGE UP (ref 27–34)
MCHC RBC-ENTMCNC: 32.2 G/DL — SIGNIFICANT CHANGE UP (ref 32–36)
MCV RBC AUTO: 92.8 FL — SIGNIFICANT CHANGE UP (ref 80–100)
PCO2 BLDA: 48 MMHG — SIGNIFICANT CHANGE UP (ref 35–48)
PH BLDA: 7.31 — LOW (ref 7.35–7.45)
PHOSPHATE SERPL-MCNC: 6.3 MG/DL — HIGH (ref 2.5–4.5)
PLATELET # BLD AUTO: 91 K/UL — LOW (ref 150–400)
PO2 BLDA: 103 MMHG — SIGNIFICANT CHANGE UP (ref 83–108)
POTASSIUM SERPL-MCNC: 4 MMOL/L — SIGNIFICANT CHANGE UP (ref 3.5–5.3)
POTASSIUM SERPL-SCNC: 4 MMOL/L — SIGNIFICANT CHANGE UP (ref 3.5–5.3)
PROTHROM AB SERPL-ACNC: 11.7 SEC — SIGNIFICANT CHANGE UP (ref 10–12.9)
RBC # BLD: 4.69 M/UL — SIGNIFICANT CHANGE UP (ref 4.2–5.8)
RBC # FLD: 14.9 % — SIGNIFICANT CHANGE UP (ref 10.3–16.9)
SAO2 % BLDA: 97 % — SIGNIFICANT CHANGE UP (ref 95–100)
SODIUM SERPL-SCNC: 140 MMOL/L — SIGNIFICANT CHANGE UP (ref 135–145)
SRA INTERP SER-IMP: SIGNIFICANT CHANGE UP
VANCOMYCIN TROUGH SERPL-MCNC: 16.7 UG/ML — SIGNIFICANT CHANGE UP (ref 10–20)
WBC # BLD: 14.6 K/UL — HIGH (ref 3.8–10.5)
WBC # FLD AUTO: 14.6 K/UL — HIGH (ref 3.8–10.5)

## 2018-12-20 PROCEDURE — 99233 SBSQ HOSP IP/OBS HIGH 50: CPT | Mod: GC

## 2018-12-20 PROCEDURE — 99356: CPT

## 2018-12-20 PROCEDURE — 99497 ADVNCD CARE PLAN 30 MIN: CPT

## 2018-12-20 PROCEDURE — 71045 X-RAY EXAM CHEST 1 VIEW: CPT | Mod: 26

## 2018-12-20 PROCEDURE — 95951: CPT | Mod: 26

## 2018-12-20 PROCEDURE — 99233 SBSQ HOSP IP/OBS HIGH 50: CPT

## 2018-12-20 PROCEDURE — 99357: CPT

## 2018-12-20 PROCEDURE — 99291 CRITICAL CARE FIRST HOUR: CPT

## 2018-12-20 RX ORDER — MORPHINE SULFATE 50 MG/1
2 CAPSULE, EXTENDED RELEASE ORAL ONCE
Qty: 0 | Refills: 0 | Status: DISCONTINUED | OUTPATIENT
Start: 2018-12-20 | End: 2018-12-20

## 2018-12-20 RX ORDER — SODIUM CHLORIDE 9 MG/ML
250 INJECTION INTRAMUSCULAR; INTRAVENOUS; SUBCUTANEOUS ONCE
Qty: 0 | Refills: 0 | Status: COMPLETED | OUTPATIENT
Start: 2018-12-20 | End: 2018-12-20

## 2018-12-20 RX ORDER — VANCOMYCIN HCL 1 G
1250 VIAL (EA) INTRAVENOUS ONCE
Qty: 0 | Refills: 0 | Status: COMPLETED | OUTPATIENT
Start: 2018-12-20 | End: 2018-12-20

## 2018-12-20 RX ORDER — MORPHINE SULFATE 50 MG/1
4 CAPSULE, EXTENDED RELEASE ORAL ONCE
Qty: 0 | Refills: 0 | Status: DISCONTINUED | OUTPATIENT
Start: 2018-12-20 | End: 2018-12-20

## 2018-12-20 RX ORDER — PROPOFOL 10 MG/ML
30 INJECTION, EMULSION INTRAVENOUS ONCE
Qty: 0 | Refills: 0 | Status: COMPLETED | OUTPATIENT
Start: 2018-12-20 | End: 2018-12-20

## 2018-12-20 RX ORDER — LEVETIRACETAM 250 MG/1
500 TABLET, FILM COATED ORAL EVERY 12 HOURS
Qty: 0 | Refills: 0 | Status: DISCONTINUED | OUTPATIENT
Start: 2018-12-20 | End: 2018-12-21

## 2018-12-20 RX ORDER — ACETAMINOPHEN 500 MG
650 TABLET ORAL ONCE
Qty: 0 | Refills: 0 | Status: COMPLETED | OUTPATIENT
Start: 2018-12-20 | End: 2018-12-20

## 2018-12-20 RX ORDER — MORPHINE SULFATE 50 MG/1
2 CAPSULE, EXTENDED RELEASE ORAL EVERY 4 HOURS
Qty: 0 | Refills: 0 | Status: DISCONTINUED | OUTPATIENT
Start: 2018-12-20 | End: 2018-12-20

## 2018-12-20 RX ORDER — PIPERACILLIN AND TAZOBACTAM 4; .5 G/20ML; G/20ML
2.25 INJECTION, POWDER, LYOPHILIZED, FOR SOLUTION INTRAVENOUS EVERY 6 HOURS
Qty: 0 | Refills: 0 | Status: DISCONTINUED | OUTPATIENT
Start: 2018-12-20 | End: 2018-12-20

## 2018-12-20 RX ORDER — MORPHINE SULFATE 50 MG/1
1 CAPSULE, EXTENDED RELEASE ORAL
Qty: 100 | Refills: 0 | Status: DISCONTINUED | OUTPATIENT
Start: 2018-12-20 | End: 2018-12-20

## 2018-12-20 RX ORDER — MORPHINE SULFATE 50 MG/1
4 CAPSULE, EXTENDED RELEASE ORAL
Qty: 100 | Refills: 0 | Status: DISCONTINUED | OUTPATIENT
Start: 2018-12-20 | End: 2018-12-20

## 2018-12-20 RX ORDER — ACETAMINOPHEN 500 MG
1000 TABLET ORAL ONCE
Qty: 0 | Refills: 0 | Status: COMPLETED | OUTPATIENT
Start: 2018-12-20 | End: 2018-12-20

## 2018-12-20 RX ORDER — MORPHINE SULFATE 50 MG/1
8 CAPSULE, EXTENDED RELEASE ORAL
Qty: 100 | Refills: 0 | Status: DISCONTINUED | OUTPATIENT
Start: 2018-12-20 | End: 2018-12-21

## 2018-12-20 RX ORDER — ROBINUL 0.2 MG/ML
0.2 INJECTION INTRAMUSCULAR; INTRAVENOUS ONCE
Qty: 0 | Refills: 0 | Status: COMPLETED | OUTPATIENT
Start: 2018-12-20 | End: 2018-12-20

## 2018-12-20 RX ORDER — INSULIN HUMAN 100 [IU]/ML
5 INJECTION, SOLUTION SUBCUTANEOUS ONCE
Qty: 0 | Refills: 0 | Status: DISCONTINUED | OUTPATIENT
Start: 2018-12-20 | End: 2018-12-20

## 2018-12-20 RX ORDER — MORPHINE SULFATE 50 MG/1
2 CAPSULE, EXTENDED RELEASE ORAL
Qty: 0 | Refills: 0 | Status: DISCONTINUED | OUTPATIENT
Start: 2018-12-20 | End: 2018-12-20

## 2018-12-20 RX ORDER — INSULIN HUMAN 100 [IU]/ML
5 INJECTION, SOLUTION SUBCUTANEOUS ONCE
Qty: 0 | Refills: 0 | Status: COMPLETED | OUTPATIENT
Start: 2018-12-20 | End: 2018-12-20

## 2018-12-20 RX ADMIN — MORPHINE SULFATE 8 MG/HR: 50 CAPSULE, EXTENDED RELEASE ORAL at 23:38

## 2018-12-20 RX ADMIN — CHLORHEXIDINE GLUCONATE 15 MILLILITER(S): 213 SOLUTION TOPICAL at 18:18

## 2018-12-20 RX ADMIN — Medication 400 MILLIGRAM(S): at 06:53

## 2018-12-20 RX ADMIN — MORPHINE SULFATE 4 MILLIGRAM(S): 50 CAPSULE, EXTENDED RELEASE ORAL at 23:27

## 2018-12-20 RX ADMIN — Medication 3 MILLILITER(S): at 19:29

## 2018-12-20 RX ADMIN — PANTOPRAZOLE SODIUM 40 MILLIGRAM(S): 20 TABLET, DELAYED RELEASE ORAL at 12:34

## 2018-12-20 RX ADMIN — Medication 50 MILLIGRAM(S): at 18:18

## 2018-12-20 RX ADMIN — PIPERACILLIN AND TAZOBACTAM 200 GRAM(S): 4; .5 INJECTION, POWDER, LYOPHILIZED, FOR SOLUTION INTRAVENOUS at 05:40

## 2018-12-20 RX ADMIN — MORPHINE SULFATE 4 MILLIGRAM(S): 50 CAPSULE, EXTENDED RELEASE ORAL at 23:02

## 2018-12-20 RX ADMIN — Medication 50 MILLIGRAM(S): at 05:41

## 2018-12-20 RX ADMIN — CHLORHEXIDINE GLUCONATE 1 APPLICATION(S): 213 SOLUTION TOPICAL at 05:41

## 2018-12-20 RX ADMIN — Medication 50 MILLIGRAM(S): at 23:02

## 2018-12-20 RX ADMIN — Medication 50 MILLIGRAM(S): at 12:34

## 2018-12-20 RX ADMIN — MORPHINE SULFATE 2 MILLIGRAM(S): 50 CAPSULE, EXTENDED RELEASE ORAL at 16:37

## 2018-12-20 RX ADMIN — Medication 166.67 MILLIGRAM(S): at 02:54

## 2018-12-20 RX ADMIN — MORPHINE SULFATE 2 MILLIGRAM(S): 50 CAPSULE, EXTENDED RELEASE ORAL at 22:04

## 2018-12-20 RX ADMIN — ROBINUL 0.2 MILLIGRAM(S): 0.2 INJECTION INTRAMUSCULAR; INTRAVENOUS at 15:31

## 2018-12-20 RX ADMIN — MORPHINE SULFATE 4 MG/HR: 50 CAPSULE, EXTENDED RELEASE ORAL at 23:05

## 2018-12-20 RX ADMIN — Medication 3 MILLILITER(S): at 09:25

## 2018-12-20 RX ADMIN — Medication 650 MILLIGRAM(S): at 23:01

## 2018-12-20 RX ADMIN — SODIUM CHLORIDE 125 MILLILITER(S): 9 INJECTION INTRAMUSCULAR; INTRAVENOUS; SUBCUTANEOUS at 00:35

## 2018-12-20 RX ADMIN — Medication 3 MILLILITER(S): at 15:53

## 2018-12-20 RX ADMIN — MORPHINE SULFATE 4 MG/HR: 50 CAPSULE, EXTENDED RELEASE ORAL at 23:20

## 2018-12-20 RX ADMIN — PROPOFOL 30 MILLIGRAM(S): 10 INJECTION, EMULSION INTRAVENOUS at 09:47

## 2018-12-20 RX ADMIN — MORPHINE SULFATE 2 MILLIGRAM(S): 50 CAPSULE, EXTENDED RELEASE ORAL at 21:52

## 2018-12-20 RX ADMIN — LEVETIRACETAM 400 MILLIGRAM(S): 250 TABLET, FILM COATED ORAL at 09:46

## 2018-12-20 RX ADMIN — CHLORHEXIDINE GLUCONATE 15 MILLILITER(S): 213 SOLUTION TOPICAL at 05:41

## 2018-12-20 RX ADMIN — Medication 1 MILLIGRAM(S): at 15:30

## 2018-12-20 RX ADMIN — Medication 1000 MILLIGRAM(S): at 07:35

## 2018-12-20 RX ADMIN — LEVETIRACETAM 400 MILLIGRAM(S): 250 TABLET, FILM COATED ORAL at 21:51

## 2018-12-20 RX ADMIN — Medication 6: at 12:32

## 2018-12-20 RX ADMIN — INSULIN HUMAN 5 UNIT(S): 100 INJECTION, SOLUTION SUBCUTANEOUS at 06:53

## 2018-12-20 RX ADMIN — MORPHINE SULFATE 8 MG/HR: 50 CAPSULE, EXTENDED RELEASE ORAL at 23:44

## 2018-12-20 RX ADMIN — Medication 3 MILLILITER(S): at 01:22

## 2018-12-20 RX ADMIN — MORPHINE SULFATE 2 MILLIGRAM(S): 50 CAPSULE, EXTENDED RELEASE ORAL at 15:31

## 2018-12-20 RX ADMIN — PIPERACILLIN AND TAZOBACTAM 200 GRAM(S): 4; .5 INJECTION, POWDER, LYOPHILIZED, FOR SOLUTION INTRAVENOUS at 12:20

## 2018-12-20 RX ADMIN — Medication 10: at 05:41

## 2018-12-20 RX ADMIN — Medication 3 MILLILITER(S): at 06:18

## 2018-12-20 RX ADMIN — Medication 650 MILLIGRAM(S): at 22:00

## 2018-12-20 RX ADMIN — HEPARIN SODIUM 6 UNIT(S)/HR: 5000 INJECTION INTRAVENOUS; SUBCUTANEOUS at 00:35

## 2018-12-20 NOTE — PROGRESS NOTE ADULT - PROBLEM SELECTOR PROBLEM 6
Palliative care by specialist Discussion about advance care planning held with family member Medical orders for life-sustaining treatment (MOLST) form in chart

## 2018-12-20 NOTE — PROGRESS NOTE ADULT - PROBLEM SELECTOR PLAN 2
Currently with occasional myoclonus on Keppra BID. Prognosis is grim. Ongoing EEG to assess extent of hypoxia. Apnea testing showed minimal respiratory drive but otherwise not expected to have any meaningful recovery. Discussed with Neurology and MICU. Plan to update the wife today and discuss possible de escalation including but not limited to palliative extubation. Remains DNR. Continues to require vent support, currently off sedation. Continues on vasopressors and heparin drip.  Management as per MICU team.

## 2018-12-20 NOTE — PROGRESS NOTE ADULT - ATTENDING COMMENTS
Patient seen and examined with house-staff during bedside rounds.  Resident note read, including vitals, physical findings, laboratory data, and radiological reports.   Revisions included below.  Direct personal management at bed side and extensive interpretation of the data.  Plan was outlined and discussed in details with the housestaff.  Decision making of high complexity  Action taken for acute disease activity to reflect the level of care provided:  - medication reconciliation  - review laboratory data  Respiratory failure secondary to cardiac arrest, renal failure anoxic encephalopathy, copd PTX with bronchopleural fistula, shock  no corneal, cough, gag reflexes, flaccid off sedative, CT scan of head consistent with anoxic injury  apnea test   will follow on brain death criteria  discussed with family

## 2018-12-20 NOTE — CHART NOTE - NSCHARTNOTEFT_GEN_A_CORE
Events and chart reviewed from past 24 hours.  CT surgery PA placed emergent pigtail on 12/18/18 for tension pneumothorax, which is now resolved.  Persistent 1/5 air leak.  Tube to be kept to suction at all times.  Pt DNR.  Following for pigtail management. Daily CXR while tube is in place.

## 2018-12-20 NOTE — PROGRESS NOTE ADULT - SUBJECTIVE AND OBJECTIVE BOX
NOTE INCOMPLETE***    OVERNIGHT EVENTS: Pigtail chest tube placed for pneumothorax.    SUBJECTIVE / INTERVAL HPI: Patient seen and examined at bedside. Intubated, non-responsive. Unable to obtain ROS.     VITAL SIGNS:  Vital Signs Last 24 Hrs  T(C): 38.7 (20 Dec 2018 03:31), Max: 39.1 (19 Dec 2018 06:45)  T(F): 101.7 (20 Dec 2018 03:31), Max: 102.3 (19 Dec 2018 06:45)  HR: 84 (20 Dec 2018 05:00) (76 - 112)  BP: 109/62 (19 Dec 2018 21:00) (109/62 - 120/70)  BP(mean): 75 (19 Dec 2018 21:00) (75 - 87)  RR: 20 (20 Dec 2018 05:00) (10 - 32)  SpO2: 96% (20 Dec 2018 05:00) (93% - 98%)    PHYSICAL EXAM:    GEN: Obese male, intubated, sedated  ENT: MMM, dry blood at perioral area, no active bleeding within mouth  Neck: No JVD  Cardiovascular: Regular rate and rhythm, +S1 S2. No murmurs, rubs, or gallops  Respiratory: No retractions, CTAB, no wheezing  Gastrointestinal:  Soft, non-tender, non-distended, +BS  Extremities: Cold hands and feet, capillary refill <2sec; 1+ non-pitting LE edema to mid-calves   Vascular: Radial and pedal pulses 2+ bilaterally  Neurologic: AAOx0, unable to assess; intubated and sedated    MEDICATIONS:  MEDICATIONS  (STANDING):  acetaminophen    Suspension .. 1000 milliGRAM(s) Oral once  acetaminophen  IVPB .. 1000 milliGRAM(s) IV Intermittent once  ALBUTerol/ipratropium for Nebulization 3 milliLiter(s) Nebulizer every 4 hours  chlorhexidine 0.12% Liquid 15 milliLiter(s) Oral Mucosa every 12 hours  chlorhexidine 2% Cloths 1 Application(s) Topical <User Schedule>  dextrose 5%. 1000 milliLiter(s) (50 mL/Hr) IV Continuous <Continuous>  dextrose 50% Injectable 12.5 Gram(s) IV Push once  dextrose 50% Injectable 25 Gram(s) IV Push once  dextrose 50% Injectable 25 Gram(s) IV Push once  heparin  Infusion 600 Unit(s)/Hr (6 mL/Hr) IV Continuous <Continuous>  hydrocortisone sodium succinate Injectable 50 milliGRAM(s) IV Push every 6 hours  insulin glargine Injectable (LANTUS) 12 Unit(s) SubCutaneous at bedtime  insulin lispro (HumaLOG) corrective regimen sliding scale   SubCutaneous every 6 hours  insulin regular  human recombinant. 5 Unit(s) SubCutaneous once  levETIRAcetam  IVPB 750 milliGRAM(s) IV Intermittent every 12 hours  levothyroxine Injectable 25 MICROGram(s) IV Push at bedtime  norepinephrine Infusion 0.04 MICROgram(s)/kG/Min (3.75 mL/Hr) IV Continuous <Continuous>  pantoprazole  Injectable 40 milliGRAM(s) IV Push daily  piperacillin/tazobactam IVPB. 3.375 Gram(s) IV Intermittent every 6 hours    MEDICATIONS  (PRN):  dextrose 40% Gel 15 Gram(s) Oral once PRN Blood Glucose LESS THAN 70 milliGRAM(s)/deciliter  glucagon  Injectable 1 milliGRAM(s) IntraMuscular once PRN Glucose LESS THAN 70 milligrams/deciliter      ALLERGIES:  Allergies    No Known Allergies    Intolerances        LABS:                        14.0   14.6  )-----------( 91       ( 20 Dec 2018 03:19 )             43.5     12-20    140  |  95<L>  |  81<H>  ----------------------------<  454<HH>  4.0   |  23  |  5.69<H>    Ca    6.9<L>      20 Dec 2018 03:19  Phos  6.3     12-20  Mg     2.6     12-20    TPro  5.0<L>  /  Alb  2.6<L>  /  TBili  0.5  /  DBili  x   /  AST  367<H>  /  ALT  243<H>  /  AlkPhos  84  12-19    PT/INR - ( 20 Dec 2018 03:19 )   PT: 11.7 sec;   INR: 1.03          PTT - ( 20 Dec 2018 03:19 )  PTT:69.6 sec  Urinalysis Basic - ( 19 Dec 2018 09:51 )    Color: Yellow / Appearance: Clear / SG: >=1.030 / pH: x  Gluc: x / Ketone: Trace mg/dL  / Bili: Small / Urobili: 0.2 E.U./dL   Blood: x / Protein: 100 mg/dL / Nitrite: NEGATIVE   Leuk Esterase: NEGATIVE / RBC: Many /HPF / WBC < 5 /HPF   Sq Epi: x / Non Sq Epi: 0-5 /HPF / Bacteria: Present /HPF    CAPILLARY BLOOD GLUCOSE    POCT Blood Glucose.: 371 mg/dL (20 Dec 2018 05:28) OVERNIGHT EVENTS: Pigtail chest tube placed for pneumothorax.    SUBJECTIVE / INTERVAL HPI: Patient seen and examined at bedside. Intubated, non-responsive. Unable to obtain ROS.     VITAL SIGNS:  Vital Signs Last 24 Hrs  T(C): 38.7 (20 Dec 2018 03:31), Max: 39.1 (19 Dec 2018 06:45)  T(F): 101.7 (20 Dec 2018 03:31), Max: 102.3 (19 Dec 2018 06:45)  HR: 84 (20 Dec 2018 05:00) (76 - 112)  BP: 109/62 (19 Dec 2018 21:00) (109/62 - 120/70)  BP(mean): 75 (19 Dec 2018 21:00) (75 - 87)  RR: 20 (20 Dec 2018 05:00) (10 - 32)  SpO2: 96% (20 Dec 2018 05:00) (93% - 98%)    PHYSICAL EXAM:    GEN: Obese male, intubated  ENT: MMM  Neck: No JVD  Cardiovascular: Regular rate and rhythm, +S1 S2. No murmurs, rubs, or gallops  Respiratory: No retractions, CTAB, no wheezing  Gastrointestinal:  Soft, non-tender, non-distended, +BS  Extremities: Cold hands and feet, capillary refill <2sec; 1+ pitting LE edema to mid-calves   Vascular: Radial and pedal pulses 2+ bilaterally  Neurologic: AAOx0, unable to assess; intubated    MEDICATIONS:  MEDICATIONS  (STANDING):  acetaminophen    Suspension .. 1000 milliGRAM(s) Oral once  acetaminophen  IVPB .. 1000 milliGRAM(s) IV Intermittent once  ALBUTerol/ipratropium for Nebulization 3 milliLiter(s) Nebulizer every 4 hours  chlorhexidine 0.12% Liquid 15 milliLiter(s) Oral Mucosa every 12 hours  chlorhexidine 2% Cloths 1 Application(s) Topical <User Schedule>  dextrose 5%. 1000 milliLiter(s) (50 mL/Hr) IV Continuous <Continuous>  dextrose 50% Injectable 12.5 Gram(s) IV Push once  dextrose 50% Injectable 25 Gram(s) IV Push once  dextrose 50% Injectable 25 Gram(s) IV Push once  heparin  Infusion 600 Unit(s)/Hr (6 mL/Hr) IV Continuous <Continuous>  hydrocortisone sodium succinate Injectable 50 milliGRAM(s) IV Push every 6 hours  insulin glargine Injectable (LANTUS) 12 Unit(s) SubCutaneous at bedtime  insulin lispro (HumaLOG) corrective regimen sliding scale   SubCutaneous every 6 hours  insulin regular  human recombinant. 5 Unit(s) SubCutaneous once  levETIRAcetam  IVPB 750 milliGRAM(s) IV Intermittent every 12 hours  levothyroxine Injectable 25 MICROGram(s) IV Push at bedtime  norepinephrine Infusion 0.04 MICROgram(s)/kG/Min (3.75 mL/Hr) IV Continuous <Continuous>  pantoprazole  Injectable 40 milliGRAM(s) IV Push daily  piperacillin/tazobactam IVPB. 3.375 Gram(s) IV Intermittent every 6 hours    MEDICATIONS  (PRN):  dextrose 40% Gel 15 Gram(s) Oral once PRN Blood Glucose LESS THAN 70 milliGRAM(s)/deciliter  glucagon  Injectable 1 milliGRAM(s) IntraMuscular once PRN Glucose LESS THAN 70 milligrams/deciliter      ALLERGIES:  Allergies    No Known Allergies    Intolerances        LABS:                        14.0   14.6  )-----------( 91       ( 20 Dec 2018 03:19 )             43.5     12-20    140  |  95<L>  |  81<H>  ----------------------------<  454<HH>  4.0   |  23  |  5.69<H>    Ca    6.9<L>      20 Dec 2018 03:19  Phos  6.3     12-20  Mg     2.6     12-20    TPro  5.0<L>  /  Alb  2.6<L>  /  TBili  0.5  /  DBili  x   /  AST  367<H>  /  ALT  243<H>  /  AlkPhos  84  12-19    PT/INR - ( 20 Dec 2018 03:19 )   PT: 11.7 sec;   INR: 1.03          PTT - ( 20 Dec 2018 03:19 )  PTT:69.6 sec  Urinalysis Basic - ( 19 Dec 2018 09:51 )    Color: Yellow / Appearance: Clear / SG: >=1.030 / pH: x  Gluc: x / Ketone: Trace mg/dL  / Bili: Small / Urobili: 0.2 E.U./dL   Blood: x / Protein: 100 mg/dL / Nitrite: NEGATIVE   Leuk Esterase: NEGATIVE / RBC: Many /HPF / WBC < 5 /HPF   Sq Epi: x / Non Sq Epi: 0-5 /HPF / Bacteria: Present /HPF    CAPILLARY BLOOD GLUCOSE    POCT Blood Glucose.: 371 mg/dL (20 Dec 2018 05:28) OVERNIGHT EVENTS: Pigtail chest tube placed for pneumothorax by surgery.    SUBJECTIVE / INTERVAL HPI: Patient seen and examined at bedside. Intubated, non-responsive. Unable to obtain ROS.     VITAL SIGNS:  Vital Signs Last 24 Hrs  T(C): 38.7 (20 Dec 2018 03:31), Max: 39.1 (19 Dec 2018 06:45)  T(F): 101.7 (20 Dec 2018 03:31), Max: 102.3 (19 Dec 2018 06:45)  HR: 84 (20 Dec 2018 05:00) (76 - 112)  BP: 109/62 (19 Dec 2018 21:00) (109/62 - 120/70)  BP(mean): 75 (19 Dec 2018 21:00) (75 - 87)  RR: 20 (20 Dec 2018 05:00) (10 - 32)  SpO2: 96% (20 Dec 2018 05:00) (93% - 98%)    PHYSICAL EXAM:    GEN: Obese male, intubated  ENT: MMM  Neck: No JVD  Cardiovascular: Regular rate and rhythm, +S1 S2. No murmurs, rubs, or gallops  Respiratory: No retractions, CTAB, no wheezing  Gastrointestinal:  Soft, non-tender, non-distended, +BS  Extremities: Cold hands and feet, capillary refill <2sec; 1+ pitting LE edema to mid-calves   Vascular: Radial and pedal pulses 2+ bilaterally  Neurologic: AAOx0, unable to assess; intubated    MEDICATIONS:  MEDICATIONS  (STANDING):  acetaminophen    Suspension .. 1000 milliGRAM(s) Oral once  acetaminophen  IVPB .. 1000 milliGRAM(s) IV Intermittent once  ALBUTerol/ipratropium for Nebulization 3 milliLiter(s) Nebulizer every 4 hours  chlorhexidine 0.12% Liquid 15 milliLiter(s) Oral Mucosa every 12 hours  chlorhexidine 2% Cloths 1 Application(s) Topical <User Schedule>  dextrose 5%. 1000 milliLiter(s) (50 mL/Hr) IV Continuous <Continuous>  dextrose 50% Injectable 12.5 Gram(s) IV Push once  dextrose 50% Injectable 25 Gram(s) IV Push once  dextrose 50% Injectable 25 Gram(s) IV Push once  heparin  Infusion 600 Unit(s)/Hr (6 mL/Hr) IV Continuous <Continuous>  hydrocortisone sodium succinate Injectable 50 milliGRAM(s) IV Push every 6 hours  insulin glargine Injectable (LANTUS) 12 Unit(s) SubCutaneous at bedtime  insulin lispro (HumaLOG) corrective regimen sliding scale   SubCutaneous every 6 hours  insulin regular  human recombinant. 5 Unit(s) SubCutaneous once  levETIRAcetam  IVPB 750 milliGRAM(s) IV Intermittent every 12 hours  levothyroxine Injectable 25 MICROGram(s) IV Push at bedtime  norepinephrine Infusion 0.04 MICROgram(s)/kG/Min (3.75 mL/Hr) IV Continuous <Continuous>  pantoprazole  Injectable 40 milliGRAM(s) IV Push daily  piperacillin/tazobactam IVPB. 3.375 Gram(s) IV Intermittent every 6 hours    MEDICATIONS  (PRN):  dextrose 40% Gel 15 Gram(s) Oral once PRN Blood Glucose LESS THAN 70 milliGRAM(s)/deciliter  glucagon  Injectable 1 milliGRAM(s) IntraMuscular once PRN Glucose LESS THAN 70 milligrams/deciliter      ALLERGIES:  Allergies    No Known Allergies    Intolerances        LABS:                        14.0   14.6  )-----------( 91       ( 20 Dec 2018 03:19 )             43.5     12-20    140  |  95<L>  |  81<H>  ----------------------------<  454<HH>  4.0   |  23  |  5.69<H>    Ca    6.9<L>      20 Dec 2018 03:19  Phos  6.3     12-20  Mg     2.6     12-20    TPro  5.0<L>  /  Alb  2.6<L>  /  TBili  0.5  /  DBili  x   /  AST  367<H>  /  ALT  243<H>  /  AlkPhos  84  12-19    PT/INR - ( 20 Dec 2018 03:19 )   PT: 11.7 sec;   INR: 1.03          PTT - ( 20 Dec 2018 03:19 )  PTT:69.6 sec  Urinalysis Basic - ( 19 Dec 2018 09:51 )    Color: Yellow / Appearance: Clear / SG: >=1.030 / pH: x  Gluc: x / Ketone: Trace mg/dL  / Bili: Small / Urobili: 0.2 E.U./dL   Blood: x / Protein: 100 mg/dL / Nitrite: NEGATIVE   Leuk Esterase: NEGATIVE / RBC: Many /HPF / WBC < 5 /HPF   Sq Epi: x / Non Sq Epi: 0-5 /HPF / Bacteria: Present /HPF    CAPILLARY BLOOD GLUCOSE    POCT Blood Glucose.: 371 mg/dL (20 Dec 2018 05:28) HOSPITAL COURSE    Mr. Wei is a 62 yo man with history of COPD, T2DM, HTN and current smoker who presented to [facility] ED in cardiac arrest. Patient was seen by PMD with complaints of several weeks of worsening lethargy and shortness of breath. Patient was referred to ER, but patient wished to go to [facility] and took a cab with his wife. Patient was being helped exit the cab outside the ED and found to be in cardiac arrest, and CPR was initiated in the street. [facility] had compressions ongoing for about 30minutes and achieved ROSC at 12:26 PM on 12/18/18. Patient was intubated and started on levophed for pressure support. Chest tube was placed for R pneumothorax identified on CXR. EKG on admission showed RBBB and echo showed R heart strain, and patient was started on heparin gtt for suspected PE. Vancomycin and Zosyn were also started, as well as stress dose steroids and Keppra for myoclonic jerking motions. EEG was placed with no identified epileptiform movements. Patient underwent CT head which showed anoxic brain injury. Family discussions with wife and sister and palliative care team were ongoing, and family decided to pursue comfort care only with plan for palliative extubation.    OVERNIGHT EVENTS: Pigtail chest tube placed for pneumothorax by surgery.    SUBJECTIVE / INTERVAL HPI: Patient seen and examined at bedside. Intubated, non-responsive. Unable to obtain ROS.     VITAL SIGNS:  Vital Signs Last 24 Hrs  T(C): 38.7 (20 Dec 2018 03:31), Max: 39.1 (19 Dec 2018 06:45)  T(F): 101.7 (20 Dec 2018 03:31), Max: 102.3 (19 Dec 2018 06:45)  HR: 84 (20 Dec 2018 05:00) (76 - 112)  BP: 109/62 (19 Dec 2018 21:00) (109/62 - 120/70)  BP(mean): 75 (19 Dec 2018 21:00) (75 - 87)  RR: 20 (20 Dec 2018 05:00) (10 - 32)  SpO2: 96% (20 Dec 2018 05:00) (93% - 98%)    PHYSICAL EXAM:    GEN: Obese male, intubated  ENT: MMM  Neck: No JVD  Cardiovascular: Regular rate and rhythm, +S1 S2. No murmurs, rubs, or gallops  Respiratory: No retractions, CTAB, no wheezing  Gastrointestinal:  Soft, non-tender, non-distended, +BS  Extremities: Cold hands and feet, capillary refill <2sec; 1+ pitting LE edema to mid-calves   Vascular: Radial and pedal pulses 2+ bilaterally  Neurologic: AAOx0, unable to assess; intubated    MEDICATIONS:  MEDICATIONS  (STANDING):  acetaminophen    Suspension .. 1000 milliGRAM(s) Oral once  acetaminophen  IVPB .. 1000 milliGRAM(s) IV Intermittent once  ALBUTerol/ipratropium for Nebulization 3 milliLiter(s) Nebulizer every 4 hours  chlorhexidine 0.12% Liquid 15 milliLiter(s) Oral Mucosa every 12 hours  chlorhexidine 2% Cloths 1 Application(s) Topical <User Schedule>  dextrose 5%. 1000 milliLiter(s) (50 mL/Hr) IV Continuous <Continuous>  dextrose 50% Injectable 12.5 Gram(s) IV Push once  dextrose 50% Injectable 25 Gram(s) IV Push once  dextrose 50% Injectable 25 Gram(s) IV Push once  heparin  Infusion 600 Unit(s)/Hr (6 mL/Hr) IV Continuous <Continuous>  hydrocortisone sodium succinate Injectable 50 milliGRAM(s) IV Push every 6 hours  insulin glargine Injectable (LANTUS) 12 Unit(s) SubCutaneous at bedtime  insulin lispro (HumaLOG) corrective regimen sliding scale   SubCutaneous every 6 hours  insulin regular  human recombinant. 5 Unit(s) SubCutaneous once  levETIRAcetam  IVPB 750 milliGRAM(s) IV Intermittent every 12 hours  levothyroxine Injectable 25 MICROGram(s) IV Push at bedtime  norepinephrine Infusion 0.04 MICROgram(s)/kG/Min (3.75 mL/Hr) IV Continuous <Continuous>  pantoprazole  Injectable 40 milliGRAM(s) IV Push daily  piperacillin/tazobactam IVPB. 3.375 Gram(s) IV Intermittent every 6 hours    MEDICATIONS  (PRN):  dextrose 40% Gel 15 Gram(s) Oral once PRN Blood Glucose LESS THAN 70 milliGRAM(s)/deciliter  glucagon  Injectable 1 milliGRAM(s) IntraMuscular once PRN Glucose LESS THAN 70 milligrams/deciliter      ALLERGIES:  Allergies    No Known Allergies    Intolerances        LABS:                        14.0   14.6  )-----------( 91       ( 20 Dec 2018 03:19 )             43.5     12-20    140  |  95<L>  |  81<H>  ----------------------------<  454<HH>  4.0   |  23  |  5.69<H>    Ca    6.9<L>      20 Dec 2018 03:19  Phos  6.3     12-20  Mg     2.6     12-20    TPro  5.0<L>  /  Alb  2.6<L>  /  TBili  0.5  /  DBili  x   /  AST  367<H>  /  ALT  243<H>  /  AlkPhos  84  12-19    PT/INR - ( 20 Dec 2018 03:19 )   PT: 11.7 sec;   INR: 1.03          PTT - ( 20 Dec 2018 03:19 )  PTT:69.6 sec  Urinalysis Basic - ( 19 Dec 2018 09:51 )    Color: Yellow / Appearance: Clear / SG: >=1.030 / pH: x  Gluc: x / Ketone: Trace mg/dL  / Bili: Small / Urobili: 0.2 E.U./dL   Blood: x / Protein: 100 mg/dL / Nitrite: NEGATIVE   Leuk Esterase: NEGATIVE / RBC: Many /HPF / WBC < 5 /HPF   Sq Epi: x / Non Sq Epi: 0-5 /HPF / Bacteria: Present /HPF    CAPILLARY BLOOD GLUCOSE    POCT Blood Glucose.: 371 mg/dL (20 Dec 2018 05:28)

## 2018-12-20 NOTE — PROGRESS NOTE ADULT - SUBJECTIVE AND OBJECTIVE BOX
BRIAN WEI             MRN-[id number]    CC: Cardiac arrest, GOC/AD, Support    HPI:  62 y/o M heavy smoker with a history of COPD, NIDDM, and HTN, presented to ED in cardiac arrest. He was seen by PMD Dr. Sonido Rollins this morning in [place] and had complained of progressively worsening lethargy and SOB for the past several weeks. As per a telephone conversation with Dr. Rollins, the patient appeared to be having difficulty breathing. Dr. Rollins instructed the patient to go to the ER, but the patient preferred to go to [facility]. He drove home and took a cab to [facility] with his wife. Per report, ED staff saw the  having difficulty helping the patient exit the cab outside of the ED and found the patient to be in cardiac arrest. CPR initiated on the street; presenting rhythm asystole. Patient arrived in the resuscitation room with ongoing compressions at 11:50, with ROSC ultimately obtained at 12:26. At one point, the rhythm was believed to be VTach, and one shock delivered. Patient intubated during the code and started on levophed for pressor support. Found to have R pneumothorax on CXR; chest tube placed by CT Surgery. Given epi x4, bicarb, magnesium, solumedrol during code. Ordered for ativan 1mg IVP, keprra 1g IV, versed 5mg IVP, propofol, 2L NS bolus post code. Cardiology consulted in ED; low suspicion for ACS event upon review of EKG, bedside echo. Formal echocardiogram revealed dilated RV with decreased right sided function. Patient started on heparin gtt. MICU consulted for ICU placement.Family history: Brother  at 49 yo from heart disease. Parents  (Both over 79 yo) (18 Dec 2018 14:22)    SUBJECTIVE: Saw and evaluated Mr Wei at bedside today. No family or friends at bedside. The patient remains on vent support. Currently off propofol and only on Heparin, Levophed, and Keppra. Some myoclonic jerking movements still witnessed. NGT to suction of dark bloody discharge. Patient now with chest tube. Continues EEG monitoring.     ROS:  Unable to attain due to:  Medical condition  Dyspnea (Jose 0-10):  2 Mechanically vented                      N/V (Y/N): Unable to assess                             Secretions (Y/N) :  No  Agitation(Y/N): No  Pain (Y/N): MANDEEP pain scale: 2  -Provocation/Palliation: Unable to assess       -Quality/Quantity: Unable to assess       -Radiating: Unable to assess       -Severity: No pain  -Timing/Frequency: Unable to assess       -Impact on ADLs: Unable to assess         OTHER REVIEW OF SYSTEMS UNABLE TO OBTAIN  due to: Medical condition    PEx:  T(C): 36 (18 @ 09:41), Max: 38.7 (18 @ 03:31)  HR: 72 (18 @ 09:02) (68 - 112)  BP: 109/62 (18 @ 21:00) (109/62 - 120/70)  RR: 20 (18 @ 09:02) (10 - 32)  SpO2: 96% (18 @ 09:02) (93% - 98%)  Wt(kg): 80    General: Elderly overweight man intubated sedated unresponsive to painful stimulus.   HEENT: Fixed gaze Pupils sluggish No response to threat. ETT+  NGT+ dark bloody discharge  Neck: Supple   CVS: RR S1S2    Resp: Unlabored Mechanically vented Not over breathing the vent  GI: Globose Soft Distended BS+  :  Normal  Musc:  Myoclonus+  No C/C  LLE iO line  Neuro: Off propofol Unresponsive   Psych: Not agitated  Skin: Non jaundiced  Lymph: Edema -	     ALLERGIES: No Known Allergies    OPIATE NAÏVE (Y/N): Yes    MEDICATIONS: REVIEWED  MEDICATIONS  (STANDING):  ALBUTerol/ipratropium for Nebulization 3 milliLiter(s) Nebulizer every 4 hours  chlorhexidine 0.12% Liquid 15 milliLiter(s) Oral Mucosa every 12 hours  chlorhexidine 2% Cloths 1 Application(s) Topical <User Schedule>  heparin  Infusion 600 Unit(s)/Hr (6 mL/Hr) IV Continuous <Continuous>  hydrocortisone sodium succinate Injectable 50 milliGRAM(s) IV Push every 6 hours  insulin glargine Injectable (LANTUS) 12 Unit(s) SubCutaneous at bedtime  insulin lispro (HumaLOG) corrective regimen sliding scale   SubCutaneous every 6 hours  levETIRAcetam  IVPB 500 milliGRAM(s) IV Intermittent every 12 hours  levothyroxine Injectable 25 MICROGram(s) IV Push at bedtime  norepinephrine Infusion 0.04 MICROgram(s)/kG/Min (3.75 mL/Hr) IV Continuous <Continuous>  pantoprazole  Injectable 40 milliGRAM(s) IV Push daily  piperacillin/tazobactam IVPB. 2.25 Gram(s) IV Intermittent every 6 hours    MEDICATIONS  (PRN):  dextrose 40% Gel 15 Gram(s) Oral once PRN Blood Glucose LESS THAN 70 milliGRAM(s)/deciliter  glucagon  Injectable 1 milliGRAM(s) IntraMuscular once PRN Glucose LESS THAN 70 milligrams/deciliter    LABS: REVIEWED  CBC:                        14.0   14.6  )------( 91       ( 20 Dec 2018 03:19 )             43.5     CMP:        140  |  95<L>  |  81<H>  ----------------------------<  454<HH>  4.0   |  23  |  5.69<H>    Ca    6.9<L>      20 Dec 2018 03:19  Phos  6.3       Mg     2.6         TPro  5.0<L>  /  Alb  2.6<L>  /  TBili  0.5  /  DBili  x   /  AST  367<H>  /  ALT  243<H>  /  AlkPhos  84      POCT Blood Glucose.: 360 mg/dL (18 @ 10:32)    Blood Gas Profile - Arterial in AM (18 @ 07:08)    pH, Arterial: 7.31    pCO2, Arterial: 48 mmHg    pO2, Arterial: 103 mmHg    HCO3, Arterial: 24 mmol/L    Base Excess, Arterial: -2.6 mmol/L    Oxygen Saturation, Arterial: 97 %    Vancomycin Level, Trough: 16.7 ug/mL (18 @ 00:46)    Blood Gas Profile - Arterial in AM (18 @ 07:08)    pH, Arterial: 7.31    pCO2, Arterial: 48 mmHg    pO2, Arterial: 103 mmHg    HCO3, Arterial: 24 mmol/L    Base Excess, Arterial: -2.6 mmol/L    Oxygen Saturation, Arterial: 97 %    IMAGING: REVIEWED    EXAM:  CT BRAIN                        PROCEDURE DATE:  2018    INTERPRETATION:  Cardiac arrest.  Technique: CT head was performed utilizing axial images from the base of   the skull through the vertex without the administration of intravenous   contrast. Images were reviewed in the bone, brain and subdural windows.  Findings: There are no prior studies available for comparison.  There is no evidence of acute intracranial hemorrhage. There is diffuse   loss of the gray-whitedifferentiation throughout the cerebral and   cerebellar hemispheres. There is diffuse effacement of the sulci. There   is slight prominence of the ventricles. There is no evidence of   mass-effect or midline shift. There is no evidence of an intra or   extra-axial fluid collection.  There is mild ethmoid sinusitis. There are tiny air-fluid levels within   the bilateral maxillary sinus. There is mild proptosis. The remaining   paranasal sinuses and bilateral mastoid air cells are clear. NG tubeand   ET tube are present.  Impression: Findings are consistent with diffuse cerebral and cerebellar   anoxia.     EXAM:  ECHOCARDIOGRAM (CARDIOL)                        PROCEDURE DATE:  2018    INTERPRETATION:  Patient Height: 170.0 cm  Patient Weight: 107.0 kg  Heart Rate: 108 bpm  Systolic Pressure: 132 mmHg  Diastolic Pressure: 78 mmHg  BSA: 2.2 m^2  Interpretation Summary  The left atrial size is normal. The right atrium is dilated.Structurally   normal aortic valve. The aortic valve is trileaflet. No aortic regurgitation   noted. No hemodynamically significant valvular aortic stenosis.  There is mild   mitral valve thickening. No mitral regrgitation noted.Structurally normal   tricuspid valve. There is trace tricuspid regurgitation.The pulmonary artery   systolic pressure is estimated to be 44 mmHg.The pulmonic valve is not well   visualized. There is trace to mild pulmonic regurgitation.  The right ventricle emili   wall is not well visualized. The right ventricle is dilated. RV function is   probably mildly reduced.  There is mild concentric left ventricular   hypertrophy. Probably normal left ventricular wall motion. The left   ventricular ejection fraction is estimated to be 60-65%  No aortic root   dilatation.Heterogenous space anterior to the heart; likely prominent   epicardial fat, cannot rule out small pericardial effusion.    Advanced Directives:     DNR    Decision maker: The patient does not have capacity for complex medical decision making given medical condition.  Legal surrogate: No documented HCP or living will. Patient's wife Jenniffer Wei [phone]    GOALS OF CARE DISCUSSION       Palliative care info/counseling provided	           Family meeting done and pending to discuss further       Advanced Directives addressed please see Advance Care Planning Note	           Documentation of GOC: DNR, but continue current level of care ie pressors, intubation, and abx. Wife decided on not have the patient resuscitated again if he passes. Will have another goals of care meeting today if wife at bedside to determine if she will want to withdraw care given findings of CT and neurology opinion           REFERRALS	        Unit SW/Case Tarun        - Following BRIAN WEI             MRN-[id number]    CC: Cardiac arrest, GOC/AD, Support    HPI:  64 y/o M heavy smoker with a history of COPD, NIDDM, and HTN, presented to ED in cardiac arrest. He was seen by PMD Dr. Sonido Rollins this morning in [place] and had complained of progressively worsening lethargy and SOB for the past several weeks. As per a telephone conversation with Dr. Rollins, the patient appeared to be having difficulty breathing. Dr. Rollins instructed the patient to go to the ER, but the patient preferred to go to [facility]. He drove home and took a cab to [facility] with his wife. Per report, ED staff saw the  having difficulty helping the patient exit the cab outside of the ED and found the patient to be in cardiac arrest. CPR initiated on the street; presenting rhythm asystole. Patient arrived in the resuscitation room with ongoing compressions at 11:50, with ROSC ultimately obtained at 12:26. At one point, the rhythm was believed to be VTach, and one shock delivered. Patient intubated during the code and started on levophed for pressor support. Found to have R pneumothorax on CXR; chest tube placed by CT Surgery. Given epi x4, bicarb, magnesium, solumedrol during code. Ordered for ativan 1mg IVP, keprra 1g IV, versed 5mg IVP, propofol, 2L NS bolus post code. Cardiology consulted in ED; low suspicion for ACS event upon review of EKG, bedside echo. Formal echocardiogram revealed dilated RV with decreased right sided function. Patient started on heparin gtt. MICU consulted for ICU placement.Family history: Brother  at 51 yo from heart disease. Parents  (Both over 79 yo) (18 Dec 2018 14:22)    SUBJECTIVE: Saw and evaluated Mr Wei at bedside today. No family or friends at bedside. The patient remains on vent support. Currently off propofol and only on Heparin, Levophed, and Keppra. Some myoclonic jerking movements still witnessed. NGT to suction of dark bloody discharge. Patient now with chest tube. Continues EEG monitoring.     ROS:  Unable to attain due to:  Medical condition  Dyspnea (Jose 0-10):  2 Mechanically vented                      N/V (Y/N): Unable to assess                             Secretions (Y/N) :  No  Agitation(Y/N): No  Pain (Y/N): MANDEEP pain scale: 2  -Provocation/Palliation: Unable to assess       -Quality/Quantity: Unable to assess       -Radiating: Unable to assess       -Severity: No pain  -Timing/Frequency: Unable to assess       -Impact on ADLs: Unable to assess         OTHER REVIEW OF SYSTEMS UNABLE TO OBTAIN  due to: Medical condition    PEx:  T(C): 36 (18 @ 09:41), Max: 38.7 (18 @ 03:31)  HR: 72 (18 @ 09:02) (68 - 112)  BP: 109/62 (18 @ 21:00) (109/62 - 120/70)  RR: 20 (18 @ 09:02) (10 - 32)  SpO2: 96% (18 @ 09:02) (93% - 98%)  Wt(kg): 80    General: Elderly overweight man intubated sedated unresponsive to painful stimulus.   HEENT: Fixed gaze Pupils sluggish No response to threat. ETT+  NGT+ dark bloody discharge  Neck: Supple   CVS: RR S1S2    Resp: Unlabored Mechanically vented Not over breathing the vent  GI: Globose Soft Distended BS+  :  Normal  Musc:  Myoclonus+  No C/C  LLE iO line  Neuro: Off propofol Unresponsive   Psych: Not agitated  Skin: Non jaundiced  Lymph: Edema -	     ALLERGIES: No Known Allergies    OPIATE NAÏVE (Y/N): Yes    MEDICATIONS: REVIEWED  MEDICATIONS  (STANDING):  ALBUTerol/ipratropium for Nebulization 3 milliLiter(s) Nebulizer every 4 hours  chlorhexidine 0.12% Liquid 15 milliLiter(s) Oral Mucosa every 12 hours  chlorhexidine 2% Cloths 1 Application(s) Topical <User Schedule>  heparin  Infusion 600 Unit(s)/Hr (6 mL/Hr) IV Continuous <Continuous>  hydrocortisone sodium succinate Injectable 50 milliGRAM(s) IV Push every 6 hours  insulin glargine Injectable (LANTUS) 12 Unit(s) SubCutaneous at bedtime  insulin lispro (HumaLOG) corrective regimen sliding scale   SubCutaneous every 6 hours  levETIRAcetam  IVPB 500 milliGRAM(s) IV Intermittent every 12 hours  levothyroxine Injectable 25 MICROGram(s) IV Push at bedtime  norepinephrine Infusion 0.04 MICROgram(s)/kG/Min (3.75 mL/Hr) IV Continuous <Continuous>  pantoprazole  Injectable 40 milliGRAM(s) IV Push daily  piperacillin/tazobactam IVPB. 2.25 Gram(s) IV Intermittent every 6 hours    MEDICATIONS  (PRN):  dextrose 40% Gel 15 Gram(s) Oral once PRN Blood Glucose LESS THAN 70 milliGRAM(s)/deciliter  glucagon  Injectable 1 milliGRAM(s) IntraMuscular once PRN Glucose LESS THAN 70 milligrams/deciliter    LABS: REVIEWED  CBC:                        14.0   14.6  )------( 91       ( 20 Dec 2018 03:19 )             43.5     CMP:        140  |  95<L>  |  81<H>  ----------------------------<  454<HH>  4.0   |  23  |  5.69<H>    Ca    6.9<L>      20 Dec 2018 03:19  Phos  6.3       Mg     2.6         TPro  5.0<L>  /  Alb  2.6<L>  /  TBili  0.5  /  DBili  x   /  AST  367<H>  /  ALT  243<H>  /  AlkPhos  84      POCT Blood Glucose.: 360 mg/dL (18 @ 10:32)    Blood Gas Profile - Arterial in AM (18 @ 07:08)    pH, Arterial: 7.31    pCO2, Arterial: 48 mmHg    pO2, Arterial: 103 mmHg    HCO3, Arterial: 24 mmol/L    Base Excess, Arterial: -2.6 mmol/L    Oxygen Saturation, Arterial: 97 %    Vancomycin Level, Trough: 16.7 ug/mL (18 @ 00:46)    Blood Gas Profile - Arterial in AM (18 @ 07:08)    pH, Arterial: 7.31    pCO2, Arterial: 48 mmHg    pO2, Arterial: 103 mmHg    HCO3, Arterial: 24 mmol/L    Base Excess, Arterial: -2.6 mmol/L    Oxygen Saturation, Arterial: 97 %    IMAGING: REVIEWED    EXAM:  CT BRAIN                        PROCEDURE DATE:  2018    INTERPRETATION:  Cardiac arrest.  Technique: CT head was performed utilizing axial images from the base of   the skull through the vertex without the administration of intravenous   contrast. Images were reviewed in the bone, brain and subdural windows.  Findings: There are no prior studies available for comparison.  There is no evidence of acute intracranial hemorrhage. There is diffuse   loss of the gray-whitedifferentiation throughout the cerebral and   cerebellar hemispheres. There is diffuse effacement of the sulci. There   is slight prominence of the ventricles. There is no evidence of   mass-effect or midline shift. There is no evidence of an intra or   extra-axial fluid collection.  There is mild ethmoid sinusitis. There are tiny air-fluid levels within   the bilateral maxillary sinus. There is mild proptosis. The remaining   paranasal sinuses and bilateral mastoid air cells are clear. NG tubeand   ET tube are present.  Impression: Findings are consistent with diffuse cerebral and cerebellar   anoxia.     EXAM:  ECHOCARDIOGRAM (CARDIOL)                        PROCEDURE DATE:  2018    INTERPRETATION:  Patient Height: 170.0 cm  Patient Weight: 107.0 kg  Heart Rate: 108 bpm  Systolic Pressure: 132 mmHg  Diastolic Pressure: 78 mmHg  BSA: 2.2 m^2  Interpretation Summary  The left atrial size is normal. The right atrium is dilated.Structurally   normal aortic valve. The aortic valve is trileaflet. No aortic regurgitation   noted. No hemodynamically significant valvular aortic stenosis.  There is mild   mitral valve thickening. No mitral regrgitation noted.Structurally normal   tricuspid valve. There is trace tricuspid regurgitation.The pulmonary artery   systolic pressure is estimated to be 44 mmHg.The pulmonic valve is not well   visualized. There is trace to mild pulmonic regurgitation.  The right ventricle emili   wall is not well visualized. The right ventricle is dilated. RV function is   probably mildly reduced.  There is mild concentric left ventricular   hypertrophy. Probably normal left ventricular wall motion. The left   ventricular ejection fraction is estimated to be 60-65%  No aortic root   dilatation.Heterogenous space anterior to the heart; likely prominent   epicardial fat, cannot rule out small pericardial effusion.    Advanced Directives:     DNR    Decision maker: The patient does not have capacity for complex medical decision making given medical condition.  Legal surrogate: No documented HCP or living will. Patient's wife Jenniffer Wei [phone]    GOALS OF CARE DISCUSSION       Palliative care info/counseling provided	           Family meeting done and pending to discuss further       Advanced Directives addressed please see Advance Care Planning Note	           Documentation of GOC: DNR, but continue current level of care ie pressors, intubation, and abx. Wife decided on not have the patient resuscitated again if he passes. Will have another goals of care meeting today if wife at bedside to determine if she will want to withdraw care given findings of CT and neurology opinion           REFERRALS	        Unit SW/Case Mgmt        [facility]  [facility]    Follow up same day prolonged service visit. A face to face encounter was performed on BRIAN WEI  Active issue:  Start time:  End time:  Time spent: +   min  Total time spent:    min  Management: Coordination of services for expected palliative extubation later today. The patients wife was at bedside in the afternoon and has decided to liberate the patient from the ventilator understanding he will likely pass away afterwards. She considers that he  2 days ago when he arrived in cardiac arrest and he would not want to have this current state BRIAN WEI             MRN-[id number]    CC: Cardiac arrest, GOC/AD, Support    HPI:  64 y/o M heavy smoker with a history of COPD, NIDDM, and HTN, presented to ED in cardiac arrest. He was seen by PMD Dr. Sonido Rollins this morning in [place] and had complained of progressively worsening lethargy and SOB for the past several weeks. As per a telephone conversation with Dr. Rollins, the patient appeared to be having difficulty breathing. Dr. Rollins instructed the patient to go to the ER, but the patient preferred to go to [facility]. He drove home and took a cab to [facility] with his wife. Per report, ED staff saw the  having difficulty helping the patient exit the cab outside of the ED and found the patient to be in cardiac arrest. CPR initiated on the street; presenting rhythm asystole. Patient arrived in the resuscitation room with ongoing compressions at 11:50, with ROSC ultimately obtained at 12:26. At one point, the rhythm was believed to be VTach, and one shock delivered. Patient intubated during the code and started on levophed for pressor support. Found to have R pneumothorax on CXR; chest tube placed by CT Surgery. Given epi x4, bicarb, magnesium, solumedrol during code. Ordered for ativan 1mg IVP, keprra 1g IV, versed 5mg IVP, propofol, 2L NS bolus post code. Cardiology consulted in ED; low suspicion for ACS event upon review of EKG, bedside echo. Formal echocardiogram revealed dilated RV with decreased right sided function. Patient started on heparin gtt. MICU consulted for ICU placement.Family history: Brother  at 49 yo from heart disease. Parents  (Both over 81 yo) (18 Dec 2018 14:22)    SUBJECTIVE: Saw and evaluated Mr Wei at bedside today. No family or friends at bedside. The patient remains on vent support. Currently off propofol and only on Heparin, Levophed, and Keppra. Some myoclonic jerking movements still witnessed. NGT to suction of dark bloody discharge. Patient now with chest tube. Continues EEG monitoring.     ROS:  Unable to attain due to:  Medical condition  Dyspnea (Jose 0-10):  2 Mechanically vented                      N/V (Y/N): Unable to assess                             Secretions (Y/N) :  No  Agitation(Y/N): No  Pain (Y/N): MANDEEP pain scale: 2  -Provocation/Palliation: Unable to assess       -Quality/Quantity: Unable to assess       -Radiating: Unable to assess       -Severity: No pain  -Timing/Frequency: Unable to assess       -Impact on ADLs: Unable to assess         OTHER REVIEW OF SYSTEMS UNABLE TO OBTAIN  due to: Medical condition    PEx:  T(C): 36 (18 @ 09:41), Max: 38.7 (18 @ 03:31)  HR: 72 (18 @ 09:02) (68 - 112)  BP: 109/62 (18 @ 21:00) (109/62 - 120/70)  RR: 20 (18 @ 09:02) (10 - 32)  SpO2: 96% (18 @ 09:02) (93% - 98%)  Wt(kg): 80    General: Elderly overweight man intubated sedated unresponsive to painful stimulus.   HEENT: Fixed gaze Pupils sluggish No response to threat. ETT+  NGT+ dark bloody discharge  Neck: Supple   CVS: RR S1S2    Resp: Unlabored Mechanically vented Not over breathing the vent  GI: Globose Soft Distended BS+  :  Normal  Musc:  Myoclonus+  No C/C  LLE iO line  Neuro: Off propofol Unresponsive   Psych: Not agitated  Skin: Non jaundiced  Lymph: Edema -	     ALLERGIES: No Known Allergies    OPIATE NAÏVE (Y/N): Yes    MEDICATIONS: REVIEWED  MEDICATIONS  (STANDING):  ALBUTerol/ipratropium for Nebulization 3 milliLiter(s) Nebulizer every 4 hours  chlorhexidine 0.12% Liquid 15 milliLiter(s) Oral Mucosa every 12 hours  chlorhexidine 2% Cloths 1 Application(s) Topical <User Schedule>  heparin  Infusion 600 Unit(s)/Hr (6 mL/Hr) IV Continuous <Continuous>  hydrocortisone sodium succinate Injectable 50 milliGRAM(s) IV Push every 6 hours  insulin glargine Injectable (LANTUS) 12 Unit(s) SubCutaneous at bedtime  insulin lispro (HumaLOG) corrective regimen sliding scale   SubCutaneous every 6 hours  levETIRAcetam  IVPB 500 milliGRAM(s) IV Intermittent every 12 hours  levothyroxine Injectable 25 MICROGram(s) IV Push at bedtime  norepinephrine Infusion 0.04 MICROgram(s)/kG/Min (3.75 mL/Hr) IV Continuous <Continuous>  pantoprazole  Injectable 40 milliGRAM(s) IV Push daily  piperacillin/tazobactam IVPB. 2.25 Gram(s) IV Intermittent every 6 hours    MEDICATIONS  (PRN):  dextrose 40% Gel 15 Gram(s) Oral once PRN Blood Glucose LESS THAN 70 milliGRAM(s)/deciliter  glucagon  Injectable 1 milliGRAM(s) IntraMuscular once PRN Glucose LESS THAN 70 milligrams/deciliter    LABS: REVIEWED  CBC:                        14.0   14.6  )------( 91       ( 20 Dec 2018 03:19 )             43.5     CMP:        140  |  95<L>  |  81<H>  ----------------------------<  454<HH>  4.0   |  23  |  5.69<H>    Ca    6.9<L>      20 Dec 2018 03:19  Phos  6.3       Mg     2.6         TPro  5.0<L>  /  Alb  2.6<L>  /  TBili  0.5  /  DBili  x   /  AST  367<H>  /  ALT  243<H>  /  AlkPhos  84      POCT Blood Glucose.: 360 mg/dL (18 @ 10:32)    Blood Gas Profile - Arterial in AM (18 @ 07:08)    pH, Arterial: 7.31    pCO2, Arterial: 48 mmHg    pO2, Arterial: 103 mmHg    HCO3, Arterial: 24 mmol/L    Base Excess, Arterial: -2.6 mmol/L    Oxygen Saturation, Arterial: 97 %    Vancomycin Level, Trough: 16.7 ug/mL (18 @ 00:46)    Blood Gas Profile - Arterial in AM (18 @ 07:08)    pH, Arterial: 7.31    pCO2, Arterial: 48 mmHg    pO2, Arterial: 103 mmHg    HCO3, Arterial: 24 mmol/L    Base Excess, Arterial: -2.6 mmol/L    Oxygen Saturation, Arterial: 97 %    IMAGING: REVIEWED    EXAM:  CT BRAIN                        PROCEDURE DATE:  2018    INTERPRETATION:  Cardiac arrest.  Technique: CT head was performed utilizing axial images from the base of   the skull through the vertex without the administration of intravenous   contrast. Images were reviewed in the bone, brain and subdural windows.  Findings: There are no prior studies available for comparison.  There is no evidence of acute intracranial hemorrhage. There is diffuse   loss of the gray-whitedifferentiation throughout the cerebral and   cerebellar hemispheres. There is diffuse effacement of the sulci. There   is slight prominence of the ventricles. There is no evidence of   mass-effect or midline shift. There is no evidence of an intra or   extra-axial fluid collection.  There is mild ethmoid sinusitis. There are tiny air-fluid levels within   the bilateral maxillary sinus. There is mild proptosis. The remaining   paranasal sinuses and bilateral mastoid air cells are clear. NG tubeand   ET tube are present.  Impression: Findings are consistent with diffuse cerebral and cerebellar   anoxia.     EXAM:  ECHOCARDIOGRAM (CARDIOL)                        PROCEDURE DATE:  2018    INTERPRETATION:  Patient Height: 170.0 cm  Patient Weight: 107.0 kg  Heart Rate: 108 bpm  Systolic Pressure: 132 mmHg  Diastolic Pressure: 78 mmHg  BSA: 2.2 m^2  Interpretation Summary  The left atrial size is normal. The right atrium is dilated.Structurally   normal aortic valve. The aortic valve is trileaflet. No aortic regurgitation   noted. No hemodynamically significant valvular aortic stenosis.  There is mild   mitral valve thickening. No mitral regrgitation noted.Structurally normal   tricuspid valve. There is trace tricuspid regurgitation.The pulmonary artery   systolic pressure is estimated to be 44 mmHg.The pulmonic valve is not well   visualized. There is trace to mild pulmonic regurgitation.  The right ventricle emili   wall is not well visualized. The right ventricle is dilated. RV function is   probably mildly reduced.  There is mild concentric left ventricular   hypertrophy. Probably normal left ventricular wall motion. The left   ventricular ejection fraction is estimated to be 60-65%  No aortic root   dilatation.Heterogenous space anterior to the heart; likely prominent   epicardial fat, cannot rule out small pericardial effusion.    Advanced Directives:     DNR    Decision maker: The patient does not have capacity for complex medical decision making given medical condition.  Legal surrogate: No documented HCP or living will. Patient's wife Jenniffer Wei [phone]    GOALS OF CARE DISCUSSION       Palliative care info/counseling provided	           Family meeting done and pending to discuss further       Advanced Directives addressed please see Advance Care Planning Note	           Documentation of GOC: DNR, but continue current level of care ie pressors, intubation, and abx. Wife decided on not have the patient resuscitated again if he passes. Will have another goals of care meeting today if wife at bedside to determine if she will want to withdraw care given findings of CT and neurology opinion           REFERRALS	        Unit SW/Case Mgmt        -  for [facility].    Follow up same day prolonged service visit. A face to face encounter was performed on BRIAN WEI  Active issue: Anoxic brain injury, respiratory failure, myoclonus, Coordination of care.  Start time: 10:30am  End time: 3:00pm  Time spent: + 90 min  Total time spent: 125 min  Management: Coordination of services for expected palliative extubation later today. The patients wife was at bedside in the afternoon and has decided to liberate the patient from the ventilator understanding he will likely pass away afterwards. She considers that he  2 days ago when he arrived in cardiac arrest and he would not want to have this current state. Spoke to the patient's son Brian Wei in [facility] over the phone and relayed the most recent updates and plan of care. He agreed to the palliative extubation and coordinating with the patient's wife assistance she may need. Also wrote him a letter to help expedite a visa [facility] [place] to travel here. I helped the patients wife arrange for cremation services in anticipation of death and she requested that the patient's body be picked up and cremated without services in order to comply with the patient's wishes of having his ashes next to his mothers ashes in [facility]. Called the chaplaincy department and requested a  which arrived at bedside and delivered last rights and [facility] as well as provided communion to the patient's wife. Discussed with the primary ICU team measures to assure the patient's comfort including but not limited to Ativan for myoclonus, morphine sulfate 2mg IV pre extubation for respiratory distress, and Glycopyrrolate 0.2mg IVP x1 for excessive secretions. Anticipate that the patient's sister would travel to the bedside later today after which the wife will notify the primary ICU team when to move forward with the extubation.

## 2018-12-20 NOTE — PROGRESS NOTE ADULT - ASSESSMENT
62 y/o M heavy smoker with a history of COPD, NIDDM, and HTN, admitted to MICU post PEA arrest, ROSC achieved after 30 minutes. Course complicated by R pneumothorax s/p chest tube. Now on heparin gtt for presumed PE.    Cardiovascular  #PEA arrest with ROSC likely 2/2 pulmonary embolus  - heparin gtt for suspected PE  - Consider CTA PE when patient is clinically stable  - LE duplex to r/o DVT  - TTE showing RV dilation with reduced function, hyperkinetic apex consistent with R heart strain, and mild concentric LVH  - Repeat TTE with EF 60-65%, mild RV reduced function. No prior Echo at PMD office  - maintain MAP > 65; currently on levophed gtt + vasopressin gtt.  - palliative following    #Hemodynamics  - Hemodynamically unstable on levophed and vasopressin  - Febrile overnight    Pulmonary  #Acute hypoxic respiratory failure secondary to cardiac arrest  - AC/CMV    #r/o massive PE  - EKG showing RBBB; Twave in V2-V4; biphasic p waves in II and III  - TTE with evidence of R heart strain  - Argatroban gtt    #Pneumothorax  - likely iatrogenic 2/2 compressions vs chest tube    #r/o aspiration pneumonia  - Febrile overnight possibly in setting of aspiration during cardiac arrest  - CXR still without evidence of new consolidation may be lagging from clinical picture  - Vancomycin and Zosyn (12/19/18-?)    Heme  #Thombocytopenia  - Four T score 4 to 5 with 14% risk of HIT. Platelets reduced by around 50% over 1 day - however no prior history of heparin use and no recent hospitalizations, unlikely to be HIT soon after heparin started  - Continue to monitor platelets    Renal  #Pre-renal ANGELY  - FeNa 0.4% likely 2/2 hypoperfusion  - Adequate UOP  - s/p 2L NS fluid resuscitation with additional 1L NS overnight  - Avoid nephrotoxic meds    Neurologic  #Possible anoxic brain injury  - On presentation with myoclonic seizures no longer present but on propofol  - Continue broad spectrum antibiotics vancomycin (dosed by level) and zosyn (12/19/18-?)    F: No IVF  E: Replete electrolytes PRN  N: NPO    Dispo: MICU  Code: DNR 62 y/o M heavy smoker with a history of COPD, NIDDM, and HTN, admitted to MICU post PEA arrest, ROSC achieved after 30 minutes. Course complicated by R pneumothorax s/p chest tube. Now on heparin gtt for presumed PE.    Cardiovascular  #PEA arrest with ROSC likely 2/2 pulmonary embolus  - heparin gtt for suspected PE  - LE duplex to r/o DVT  - TTE showing RV dilation with reduced function, hyperkinetic apex consistent with R heart strain, and mild concentric LVH  - Repeat TTE with EF 60-65%, mild RV reduced function. No prior Echo at PMD office  - palliative following    #Hemodynamics  - Hemodynamically unstable on levophed; off vasopressin  - Maintain MAP > 65; levophed at 5cc/hr  - Continued fever    Pulmonary  #Acute hypoxic respiratory failure secondary to cardiac arrest  - AC/CMV    #r/o massive PE  - EKG showing RBBB; Twave in V2-V4; biphasic p waves in II and III  - TTE with evidence of R heart strain  - Heparin gtt    #Pneumothorax  - likely iatrogenic 2/2 compressions vs chest tube  - s/p pigtail inserted    #r/o aspiration pneumonia  - Febrile overnight likely in setting of aspiration during cardiac arrest  - Vancomycin and Zosyn (12/19/18-?)    Heme  #Thombocytopenia  - Platelets downtrending compared with admission, possible in setting of shock liver, unlikely to be HIT soon after heparin started  - Continue to monitor platelets    Renal  #Pre-renal ANGELY  - FeNa 0.4% likely 2/2 hypoperfusion  - UOP dropping, uptrending Cr  - s/p 3L NS fluid resuscitation  - Avoid nephrotoxic meds    Neurologic  #Anoxic brain injury  - On presentation with myoclonic seizures no longer present  - CT head showing anoxic brain injury  - CO2 normalized  - Continue broad spectrum antibiotics vancomycin (dosed by level) and zosyn (12/19/18-?)    F: No IVF  E: Replete electrolytes PRN  N: NPO    Dispo: MICU  Code: DNR 64 y/o M heavy smoker with a history of COPD, NIDDM, and HTN, admitted to MICU post PEA arrest, ROSC achieved after 30 minutes. Course complicated by R pneumothorax s/p chest tube. Now on heparin gtt for presumed PE.    Cardiovascular  #PEA arrest with ROSC likely 2/2 pulmonary embolus  - heparin gtt for suspected PE  - LE duplex to r/o DVT  - TTE showing RV dilation with reduced function, hyperkinetic apex consistent with R heart strain, and mild concentric LVH  - Repeat TTE with EF 60-65%, mild RV reduced function. No prior Echo at PMD office  - palliative following    #Hemodynamics  - Hemodynamically unstable on levophed; off vasopressin  - Maintain MAP > 65; levophed at 5cc/hr  - Continued fever    Pulmonary  #Acute hypoxic respiratory failure secondary to cardiac arrest  - AC/CMV    #r/o massive PE  - EKG showing RBBB; Twave in V2-V4; biphasic p waves in II and III  - TTE with evidence of R heart strain  - Heparin gtt    #Pneumothorax  - likely iatrogenic 2/2 compressions  - pigtail in place      #r/o aspiration pneumonia  - Febrile overnight likely in setting of aspiration during cardiac arrest  - Vancomycin and Zosyn (12/19/18-?)    Heme  #Thombocytopenia  - Platelets downtrending compared with admission, possible in setting of shock liver, unlikely to be HIT soon after heparin started  - Continue to monitor platelets    Renal  #Pre-renal ANGELY  - FeNa 0.4% likely 2/2 hypoperfusion  - UOP dropping, uptrending Cr  - s/p 3L NS fluid resuscitation  - Avoid nephrotoxic meds    Neurologic  #Anoxic brain injury  - On presentation, with myoclonic seizures  - CT head showing anoxic brain injury  - CO2 normalized  - Apnea test negative  - Continue broad spectrum antibiotics vancomycin (dosed by level) and zosyn (12/19/18-?)    F: No IVF  E: Replete electrolytes PRN  N: NPO    Dispo: MICU  Code: DNR

## 2018-12-20 NOTE — PROGRESS NOTE ADULT - PROBLEM SELECTOR PLAN 4
DNR Wife decided not to pursue resuscitation, but would like to continue current level of care. Ongoing support being provided and will discuss further today.

## 2018-12-20 NOTE — PROGRESS NOTE ADULT - ASSESSMENT
63 yoM with pmh of asthma/COPD, DM, hypertension who presented with an asystolic cardiac arrest. ROSC was achieved after 30 minutes. CT head 24 hours post arrest with anoxic brain injury. Patient has poor prognosis given imaging findings, absent brainstem reflexes. prolonged time to ROSC and myoclonic jerks   - Palliative team to speak with family to discuss further management with family including deescalation of care.  -c/w  keppra 500 BID 63 yoM with pmh of asthma/COPD, DM, hypertension who presented with an asystolic cardiac arrest. ROSC was achieved after 30 minutes. CT head 24 hours post arrest with anoxic brain injury. Patient has poor prognosis given imaging findings, absent brainstem reflexes. prolonged time to ROSC and myoclonic jerks. Apnea test negative this morning, thus cannot declare brain death; however prognosis for neurologic recovery is very poor given factors above.   - Palliative team to speak with family to discuss further management with family including deescalation of care/ palliative extubation  - c/w  keppra 500 BID  - can d/c EEG  - call back with further questions

## 2018-12-20 NOTE — PROGRESS NOTE ADULT - SUBJECTIVE AND OBJECTIVE BOX
OVERNIGHT EVENTS: CT head with diffuse cerebral and cerebellar anoxia.       SUBJECTIVE / INTERVAL HPI: Patient seen and examined at bedside. Intubated.     VITAL SIGNS:  Vital Signs Last 24 Hrs  T(C): 36 (20 Dec 2018 09:41), Max: 38.7 (20 Dec 2018 03:31)  T(F): 96.8 (20 Dec 2018 09:41), Max: 101.7 (20 Dec 2018 03:31)  HR: 84 (20 Dec 2018 13:00) (68 - 112)  BP: 109/62 (19 Dec 2018 21:00) (109/62 - 109/62)  BP(mean): 75 (19 Dec 2018 21:00) (75 - 75)  RR: 20 (20 Dec 2018 13:00) (19 - 32)  SpO2: 99% (20 Dec 2018 13:00) (93% - 99%)    PHYSICAL EXAM:  Neurological Exam:  Mental Status: Intubated. Does not open eyelid to verbal stimuli, does not follow commands    Cranial Nerves: No grimace to pain. No vestibuloocular reflex, no corneal reflex, no gag reflex, no blink to threat    Motor: Normal tone, no spontaneous movements or withdrawal to nail bed pressure    Sensation: No response to pain     Deep Tendon Reflexes: Absent  biceps, triceps, brachioradialis, knee and ankle        MEDICATIONS:  MEDICATIONS  (STANDING):  ALBUTerol/ipratropium for Nebulization 3 milliLiter(s) Nebulizer every 4 hours  chlorhexidine 0.12% Liquid 15 milliLiter(s) Oral Mucosa every 12 hours  chlorhexidine 2% Cloths 1 Application(s) Topical <User Schedule>  hydrocortisone sodium succinate Injectable 50 milliGRAM(s) IV Push every 6 hours  levETIRAcetam  IVPB 500 milliGRAM(s) IV Intermittent every 12 hours  norepinephrine Infusion 0.04 MICROgram(s)/kG/Min (3.75 mL/Hr) IV Continuous <Continuous>    MEDICATIONS  (PRN):      ALLERGIES:  Allergies    No Known Allergies    Intolerances        LABS:                        14.0   14.6  )-----------( 91       ( 20 Dec 2018 03:19 )             43.5     12-20    140  |  95<L>  |  81<H>  ----------------------------<  454<HH>  4.0   |  23  |  5.69<H>    Ca    6.9<L>      20 Dec 2018 03:19  Phos  6.3     12-20  Mg     2.6     12-20    TPro  5.0<L>  /  Alb  2.6<L>  /  TBili  0.5  /  DBili  x   /  AST  367<H>  /  ALT  243<H>  /  AlkPhos  84  12-19    PT/INR - ( 20 Dec 2018 03:19 )   PT: 11.7 sec;   INR: 1.03          PTT - ( 20 Dec 2018 12:06 )  PTT:62.8 sec  Urinalysis Basic - ( 19 Dec 2018 09:51 )    Color: Yellow / Appearance: Clear / SG: >=1.030 / pH: x  Gluc: x / Ketone: Trace mg/dL  / Bili: Small / Urobili: 0.2 E.U./dL   Blood: x / Protein: 100 mg/dL / Nitrite: NEGATIVE   Leuk Esterase: NEGATIVE / RBC: Many /HPF / WBC < 5 /HPF   Sq Epi: x / Non Sq Epi: 0-5 /HPF / Bacteria: Present /HPF      CAPILLARY BLOOD GLUCOSE      POCT Blood Glucose.: 272 mg/dL (20 Dec 2018 12:25)      RADIOLOGY & ADDITIONAL TESTS: Reviewed.    ASSESSMENT:    PLAN: OVERNIGHT EVENTS: CT head with diffuse cerebral and cerebellar anoxia.     SUBJECTIVE / INTERVAL HPI: Patient seen and examined at bedside. Intubated.     VITAL SIGNS:  Vital Signs Last 24 Hrs  T(C): 36 (20 Dec 2018 09:41), Max: 38.7 (20 Dec 2018 03:31)  T(F): 96.8 (20 Dec 2018 09:41), Max: 101.7 (20 Dec 2018 03:31)  HR: 84 (20 Dec 2018 13:00) (68 - 112)  BP: 109/62 (19 Dec 2018 21:00) (109/62 - 109/62)  BP(mean): 75 (19 Dec 2018 21:00) (75 - 75)  RR: 20 (20 Dec 2018 13:00) (19 - 32)  SpO2: 99% (20 Dec 2018 13:00) (93% - 99%)    PHYSICAL EXAM:  Neurological Exam:  Mental Status: Intubated. Does not open eyelid to verbal stimuli, does not follow commands    Cranial Nerves: No grimace to pain. No vestibuloocular reflex, no corneal reflex, no gag reflex, no blink to threat    Motor: Normal tone, no spontaneous movements or withdrawal to nail bed pressure    Sensation: No response to pain     Deep Tendon Reflexes: Absent  biceps, triceps, brachioradialis, knee and ankle        MEDICATIONS:  MEDICATIONS  (STANDING):  ALBUTerol/ipratropium for Nebulization 3 milliLiter(s) Nebulizer every 4 hours  chlorhexidine 0.12% Liquid 15 milliLiter(s) Oral Mucosa every 12 hours  chlorhexidine 2% Cloths 1 Application(s) Topical <User Schedule>  hydrocortisone sodium succinate Injectable 50 milliGRAM(s) IV Push every 6 hours  levETIRAcetam  IVPB 500 milliGRAM(s) IV Intermittent every 12 hours  norepinephrine Infusion 0.04 MICROgram(s)/kG/Min (3.75 mL/Hr) IV Continuous <Continuous>    MEDICATIONS  (PRN):      ALLERGIES:  Allergies    No Known Allergies    Intolerances        LABS:                        14.0   14.6  )-----------( 91       ( 20 Dec 2018 03:19 )             43.5     12-20    140  |  95<L>  |  81<H>  ----------------------------<  454<HH>  4.0   |  23  |  5.69<H>    Ca    6.9<L>      20 Dec 2018 03:19  Phos  6.3     12-20  Mg     2.6     12-20    TPro  5.0<L>  /  Alb  2.6<L>  /  TBili  0.5  /  DBili  x   /  AST  367<H>  /  ALT  243<H>  /  AlkPhos  84  12-19    PT/INR - ( 20 Dec 2018 03:19 )   PT: 11.7 sec;   INR: 1.03          PTT - ( 20 Dec 2018 12:06 )  PTT:62.8 sec  Urinalysis Basic - ( 19 Dec 2018 09:51 )    Color: Yellow / Appearance: Clear / SG: >=1.030 / pH: x  Gluc: x / Ketone: Trace mg/dL  / Bili: Small / Urobili: 0.2 E.U./dL   Blood: x / Protein: 100 mg/dL / Nitrite: NEGATIVE   Leuk Esterase: NEGATIVE / RBC: Many /HPF / WBC < 5 /HPF   Sq Epi: x / Non Sq Epi: 0-5 /HPF / Bacteria: Present /HPF      CAPILLARY BLOOD GLUCOSE  POCT Blood Glucose.: 272 mg/dL (20 Dec 2018 12:25)    RADIOLOGY & ADDITIONAL TESTS: Reviewed.

## 2018-12-20 NOTE — PROGRESS NOTE ADULT - PROBLEM SELECTOR PLAN 7
Support provided to patient and family. Patient to have access to supportive services during rest of hospital stay as the patient/family deemed necessary ie. Chaplaincy, Massage therapy, Music therapy, Patient and family supportive services, Palliative SW, etc.    As discussed during the palliative IDT meeting and as identified during the patients PSSA screening the patient would benefit from patient and family support, chaplaincy, palliative SW. In addition to the EM visit, an advance care planning meeting was performed  Start time: 1:10pm  End time: 1:40pm  Total time: 30min  A face to face meeting to discuss advance care planning was held today regarding: BRIAN WEI  Primary decision maker: Wife Jenniffer Wei  Discussed advance directives including, but not limited to, healthcare proxy and code status.  Decision regarding code status: DNR, Do not reintubate after palliative extubation. Allow for a natural death after liberating from the ventilator and stopping all life prolonging measures eg. vasopressors, artificial hydration, ventilator, and antibiotics.  Documentation completed today: MOLST in paper chart

## 2018-12-20 NOTE — PROGRESS NOTE ADULT - PROBLEM SELECTOR PLAN 1
Continues to require vent support, currently off sedation. Continues on vasopressors and heparin drip.  Management as per MICU team. Patient received last rights [facility] today. Family in [place] informed about the expected palliative extubation. Wife assisted in coordinating of cremation of the patients remains. Anticipate the patient's sister will be at the bedside later today to say her goodbyes. The patient's wife will notify the primary ICU team when to proceed with palliative extubation.  Discussed with ICU residents.  Recommend Ativan 1mg IVP pre extubation now, then q1h PRN Terminal agitation/seizures  Recommend MSIR 2mg IVP 30min pre extubation, then 30min PRN RR>25/increased work of breathing  Recommend Glycopyrrolate 0.2mg IVP now, then q6h PRN Secretions  Recommend APAP 1g IVPB q6h PRN Terminal fevers  Recommend Atropine 2 drops SL q4h PRN Secretions  Recommend Haldol 1mg IV q1h PRN Terminal agitation  Recommend to discontinue vasopressors at the time of extubation.  Recommend No MEWS  Recommend to remain in ICU for first night then transition to RMF if survives  Recommend vitals (BP, Pulse, and Temp) q8h to guide management.

## 2018-12-20 NOTE — PROGRESS NOTE ADULT - PROBLEM SELECTOR PLAN 3
Wife decided not to pursue resuscitation, but would like to continue current level of care. Ongoing support being provided and will discuss further today. Currently with occasional myoclonus on Keppra BID. Prognosis is grim. Ongoing EEG to assess extent of hypoxia. Apnea testing showed minimal respiratory drive but otherwise not expected to have any meaningful recovery. Discussed with Neurology and MICU. Plan to update the wife today and discuss possible de escalation including but not limited to palliative extubation. Remains DNR.

## 2018-12-21 VITALS — HEART RATE: 115 BPM | OXYGEN SATURATION: 98 %

## 2018-12-21 LAB
CULTURE RESULTS: SIGNIFICANT CHANGE UP
SPECIMEN SOURCE: SIGNIFICANT CHANGE UP

## 2018-12-21 PROCEDURE — 99233 SBSQ HOSP IP/OBS HIGH 50: CPT

## 2018-12-21 PROCEDURE — 99356: CPT

## 2018-12-21 PROCEDURE — 95951: CPT | Mod: 26

## 2018-12-21 PROCEDURE — 99239 HOSP IP/OBS DSCHRG MGMT >30: CPT

## 2018-12-21 RX ORDER — HYDROMORPHONE HYDROCHLORIDE 2 MG/ML
0.3 INJECTION INTRAMUSCULAR; INTRAVENOUS; SUBCUTANEOUS
Qty: 10 | Refills: 0 | Status: DISCONTINUED | OUTPATIENT
Start: 2018-12-21 | End: 2018-12-21

## 2018-12-21 RX ORDER — ACETAMINOPHEN 500 MG
650 TABLET ORAL ONCE
Qty: 0 | Refills: 0 | Status: DISCONTINUED | OUTPATIENT
Start: 2018-12-21 | End: 2018-12-21

## 2018-12-21 RX ORDER — ACETAMINOPHEN 500 MG
1000 TABLET ORAL ONCE
Qty: 0 | Refills: 0 | Status: COMPLETED | OUTPATIENT
Start: 2018-12-21 | End: 2018-12-21

## 2018-12-21 RX ORDER — MORPHINE SULFATE 50 MG/1
12 CAPSULE, EXTENDED RELEASE ORAL
Qty: 100 | Refills: 0 | Status: DISCONTINUED | OUTPATIENT
Start: 2018-12-21 | End: 2018-12-21

## 2018-12-21 RX ADMIN — MORPHINE SULFATE 12 MG/HR: 50 CAPSULE, EXTENDED RELEASE ORAL at 07:27

## 2018-12-21 RX ADMIN — CHLORHEXIDINE GLUCONATE 15 MILLILITER(S): 213 SOLUTION TOPICAL at 05:38

## 2018-12-21 RX ADMIN — Medication 3 MILLILITER(S): at 01:25

## 2018-12-21 RX ADMIN — Medication 1000 MILLIGRAM(S): at 02:00

## 2018-12-21 RX ADMIN — MORPHINE SULFATE 12 MG/HR: 50 CAPSULE, EXTENDED RELEASE ORAL at 04:40

## 2018-12-21 RX ADMIN — MORPHINE SULFATE 12 MG/HR: 50 CAPSULE, EXTENDED RELEASE ORAL at 05:38

## 2018-12-21 RX ADMIN — HYDROMORPHONE HYDROCHLORIDE 1.5 MG/HR: 2 INJECTION INTRAMUSCULAR; INTRAVENOUS; SUBCUTANEOUS at 10:35

## 2018-12-21 RX ADMIN — Medication 2 MILLIGRAM(S): at 00:20

## 2018-12-21 RX ADMIN — Medication 3 MILLILITER(S): at 05:09

## 2018-12-21 RX ADMIN — CHLORHEXIDINE GLUCONATE 1 APPLICATION(S): 213 SOLUTION TOPICAL at 05:38

## 2018-12-21 RX ADMIN — Medication 50 MILLIGRAM(S): at 05:38

## 2018-12-21 RX ADMIN — LEVETIRACETAM 400 MILLIGRAM(S): 250 TABLET, FILM COATED ORAL at 08:47

## 2018-12-21 RX ADMIN — Medication 400 MILLIGRAM(S): at 01:34

## 2018-12-21 NOTE — CHART NOTE - NSCHARTNOTEFT_GEN_A_CORE
On the evening of 12/20 Dr. Marques discussed with the patient's wife, in Salvadorean, the value of adding a morphine drip to enhance the patient's comfort. Morphine was added overnight. On the morning of 12/21, Dr. Marques further addressed the wife's wishes for the patient to be comfortable, and she elected to withdraw vasoactive medications. Levophed was discontinued at 7am. On the evening of 12/20 Dr. Marques discussed with the patient's wife, in Hungarian, the value of adding a morphine drip to enhance the patient's comfort. Morphine was added overnight. On the morning of 12/21, Dr. Marques further addressed the wife's wishes for the patient to be comfortable, and she elected to withdraw vasoactive medications. Levophed was discontinued at 7am.    CCM Attending  Pt with very poor prognosis on vasoactive drips.  Discussed goals of care and futility of curretn use of vasoactive drips not changing ultimate poor prognosis and decision was to stop prolonging current futile efforts and to only maintain on morphine drip for comfort.

## 2018-12-21 NOTE — PROGRESS NOTE ADULT - PROBLEM SELECTOR PLAN 1
Patient received last rights [facility] yesterday. Family in [place] informed about the expected palliative extubation. Wife was assisted in coordinating of cremation of the patients remains. The patient's sister is at the bedside today to say her goodbyes. The patient's wife will notify the primary ICU team when to proceed with palliative extubation, maybe after lunch.  Discussed with ICU residents and primary nurse.  Recommend Ativan 1mg IVP pre extubation, then q1h PRN Terminal agitation/seizures  Currently drip changed to Dilaudid drip at 0.3mg/hr.   Recommend Dilaudid 0.25mg IV q30min PRN RR>25/increased work of breathing  Recommend Dilaudid 0.5mg IV q30min PRN RR>35/excessive work of breathing  Recommend Glycopyrrolate 0.2mg IVP pre extubation, then q6h PRN Secretions  Recommend APAP 1g IVPB q6h PRN Terminal fevers  Recommend Atropine 2 drops SL q4h PRN Secretions  Recommend Haldol 1mg IV q1h PRN Terminal agitation  Recommend No MEWS  Recommend to remain in ICU for first night then transition to RMF if survives  Recommend vitals (BP, Pulse, and Temp) q8h to guide management.

## 2018-12-21 NOTE — PROGRESS NOTE ADULT - PROBLEM SELECTOR PLAN 2
Continues to require vent support, currently off sedation. Continues off vasopressors.  Management as per MICU team.

## 2018-12-21 NOTE — PROGRESS NOTE ADULT - SUBJECTIVE AND OBJECTIVE BOX
BRIAN WEI             MRN-[id number]    CC: Cardiac arrest, GOC/AD, Support    HPI:  64 y/o M heavy smoker with a history of COPD, NIDDM, and HTN, presented to ED in cardiac arrest. He was seen by PMD Dr. Sonido Rollins this morning in [place] and had complained of progressively worsening lethargy and SOB for the past several weeks. As per a telephone conversation with Dr. Rollins, the patient appeared to be having difficulty breathing. Dr. Rollins instructed the patient to go to the ER, but the patient preferred to go to [facility]. He drove home and took a cab to [facility] with his wife. Per report, ED staff saw the  having difficulty helping the patient exit the cab outside of the ED and found the patient to be in cardiac arrest. CPR initiated on the street; presenting rhythm asystole. Patient arrived in the resuscitation room with ongoing compressions at 11:50, with ROSC ultimately obtained at 12:26. At one point, the rhythm was believed to be VTach, and one shock delivered. Patient intubated during the code and started on levophed for pressor support. Found to have R pneumothorax on CXR; chest tube placed by CT Surgery. Given epi x4, bicarb, magnesium, solumedrol during code. Ordered for ativan 1mg IVP, keprra 1g IV, versed 5mg IVP, propofol, 2L NS bolus post code. Cardiology consulted in ED; low suspicion for ACS event upon review of EKG, bedside echo. Formal echocardiogram revealed dilated RV with decreased right sided function. Patient started on heparin gtt. MICU consulted for ICU placement. Family history: Brother  at 51 yo from heart disease. Parents  (Both over 79 yo) (18 Dec 2018 14:22)    SUBJECTIVE: Saw and evaluated Mr Wei at bedside. Present at bedside is the patient's wife and sister. We discussed the events overnight and her conversations regarding comfort measures for the patient. I discussed with the primary team the patient's current orders and recommendations to switch to Dilaudid 0.3mg/hr and maintain at that dose rather than using MSIR given the patient's renal failure. Patient's was found in no apparent distress or discomfort. I discussed with the family the process of extubation and they will likely decide on doing it today. I encouraged them to eat lunch and then return to let the team / nurse know when we could proceed. The [facility] already had [facility] and [facility] services are on stand by for the patients demise and death certificate. Currently off pressors.     ROS:  Dyspnea (Jose 0-10):  0, Mechanically vented                      N/V (Y/N): Unable to assess                             Secretions (Y/N) : No  Agitation(Y/N): No  Pain (Y/N): MANDEEP pain scale: 2  -Provocation/Palliation: Unable to assess       -Quality/Quantity: Unable to assess       -Radiating: Unable to assess       -Severity: No pain  -Timing/Frequency: Unable to assess       -Impact on ADLs: Unable to assess         OTHER REVIEW OF SYSTEMS UNABLE TO OBTAIN  due to: Medical condition    PEx:  T(C): 37.4 (18 @ 05:02), Max: 39.6 (18 @ 22:23)  HR: 86 (18 @ 10:00) (82 - 152)  BP: SBP in the 40's  RR: 23 (18 @ 10:00) (19 - 31)  SpO2: 83% (18 @ 10:00) (83% - 99%)  Wt(kg): 80    General: Elderly overweight man intubated sedated unresponsive to painful stimulus.   HEENT: Fixed gaze Pupils sluggish No response to threat. ETT+  NGT+ dark bloody discharge  Neck: Supple   CVS: RR S1S2    Resp: Unlabored Mechanically vented Not over breathing the vent  GI: Globose Soft Distended BS+  :  Normal  Musc:  Myoclonus+  No C/C  LLE iO line  Neuro: Off propofol Unresponsive   Psych: Not agitated  Skin: Non jaundiced  Lymph: Edema -	     ALLERGIES: No Known Allergies    OPIATE NAÏVE (Y/N): Yes    MEDICATIONS: REVIEWED  MEDICATIONS  (STANDING):  chlorhexidine 0.12% Liquid 15 milliLiter(s) Oral Mucosa every 12 hours  chlorhexidine 2% Cloths 1 Application(s) Topical <User Schedule>  HYDROmorphone Infusion 0.3 mG/Hr (1.5 mL/Hr) IV Continuous <Continuous>  levETIRAcetam  IVPB 500 milliGRAM(s) IV Intermittent every 12 hours    LABS: REVIEWED  CBC:                        14.0   14.6  )-----------( 91       ( 20 Dec 2018 03:19 )             43.5     CMP:      140  |  95<L>  |  81<H>  ----------------------------<  454<HH>  4.0   |  23  |  5.69<H>    Ca    6.9<L>      20 Dec 2018 03:19  Phos  6.3       Mg     2.6     -    POCT Blood Glucose.: 272 mg/dL (18 @ 12:25)    Activated Partial Thromboplastin Time: 62.8 sec (18 @ 12:06)    Blood Gas Profile - Arterial in AM (18 @ 07:08)    pH, Arterial: 7.31    pCO2, Arterial: 48 mmHg    pO2, Arterial: 103 mmHg    HCO3, Arterial: 24 mmol/L    Base Excess, Arterial: -2.6 mmol/L    Oxygen Saturation, Arterial: 97 %    IMAGING: REVIEWED    EXAM:  XR CHEST PORTABLE ROUTINE 1V                        PROCEDURE DATE:  2018    INTERPRETATION:  PORTABLE CHEST X-RAY   HISTORY: s/p intubation  PRIOR STUDIES: 2018  FINDINGS: Size of cardiac silhouette unchanged. Endotracheal tube tip 3.8   cm proximal to bernardino. NG tube noted with distal aspect obscured in   abdomen. Right internal jugular venous line with tip overlying right   atrium. No pleural effusion. No pneumothorax. Lungs unchanged. Right   basal pleural pigtail catheter in situ.  IMPRESSION:  No significant interval change.    Advanced Directives:     DNR     Do Not reintubate after palliative extubation     Comfort measures    Decision maker: The patient does not have capacity for complex medical decision making given medical condition.  Legal surrogate: No documented HCP or living will. Patient's wife Jenniffer Wei [phone]    GOALS OF CARE DISCUSSION       Palliative care info/counseling provided	           Family meeting done         Documentation of GOC: DNR, but continue current level of care ie pressors, intubation, and abx. Wife decided on not have the patient resuscitated again if he passes. Had another goals of care meeting today. Plan still remains to palliative extubate. Wife to let the primary ICU team know when to proceed.           REFERRALS	        Unit SW/Case Mgmt        - [facility]

## 2018-12-21 NOTE — PROGRESS NOTE ADULT - ASSESSMENT
64 yo man with history of asthma, T2DM, and HTN who presents in cardiac arrest. Patient was found outside hospital in a taxi by ED nurse and CPR was initiated on the street. In ED, patient was in asystole, ACLS protocol began ROSC ~30min.

## 2018-12-21 NOTE — PROGRESS NOTE ADULT - ATTENDING COMMENTS
Patient seen and examined with house-staff during bedside rounds.  Resident note read, including vitals, physical findings, laboratory data, and radiological reports.   Revisions included below.  Direct personal management at bed side and extensive interpretation of the data.  Plan was outlined and discussed in details with the housestaff.  Decision making of high complexity  Action taken for acute disease activity to reflect the level of care provided:  - medication reconciliation  - review laboratory data  RF anoxic encephalopathy cardiac arrest  for terminal wean  on morphine  no labs  family at bed side

## 2018-12-21 NOTE — PROGRESS NOTE ADULT - NSHPATTENDINGPLANDISCUSS_GEN_ALL_CORE
as above
primary residents, primary nurse, patient's wife, patient's sister, patient and family support, and palliative IDT.
Primary ICU nurse, Primary ICU resident team, ICU attending, Neurology attending, and the patients wife and family.

## 2018-12-21 NOTE — PROGRESS NOTE ADULT - ASSESSMENT
62 y/o M heavy smoker with a history of COPD, NIDDM, and HTN, admitted to MICU post PEA arrest, ROSC achieved after 30 minutes. Course complicated by R pneumothorax s/p chest tube. Now on heparin gtt for presumed PE.    Cardiovascular  #PEA arrest with ROSC likely 2/2 pulmonary embolus  - TTE showing RV dilation with reduced function, hyperkinetic apex consistent with R heart strain, and mild concentric LVH  - Continue comfort measures    #Hemodynamics  - Hemodynamically unstable on levophed  - Maintain MAP > 65; levophed at 5cc/hr    Pulmonary  #Acute hypoxic respiratory failure secondary to cardiac arrest  - AC/CMV  - Comfort measures, plan palliative extubation    #r/o PE  - EKG showing RBBB; Twave in V2-V4; biphasic p waves in II and III  - TTE with evidence of R heart strain  - dc heparin gtt, comfort measures continue    #Pneumothorax  - likely iatrogenic 2/2 compressions  - pigtail in place      #Aspiration pneumonia  - Vancomycin and Zosyn (12/19/18-12/20/18)    Heme  #Thombocytopenia    Renal  #Pre-renal ANGELY  - FeNa 0.4% likely 2/2 hypoperfusion  - Stop trending, no lab draws, comfort measures    Neurologic  #Anoxic brain injury  - CT head showing anoxic brain injury  - Apnea test negative  - s/p vancomycin and zosyn (12/19/18-12/20/18), continue comfort measures  - Continue morphine gtt for air hunger and comfort measures    F: No IVF  E: Replete electrolytes PRN  N: NPO    Dispo: MICU  Code: DNR- after multiple conversations between family and palliative team, decision to withdraw care and continue comfort measures only on 12/20/18. Plan for palliative extubation when additional family (sister) arrives. 64 y/o M heavy smoker with a history of COPD, NIDDM, and HTN, admitted to MICU post PEA arrest, ROSC achieved after 30 minutes. Course complicated by R pneumothorax s/p chest tube and heparin gtt for presumed PE. Now comfort care and off pressors with plan for palliative weaning.    Cardiovascular  #PEA arrest with ROSC likely 2/2 pulmonary embolus  - TTE showing RV dilation with reduced function, hyperkinetic apex consistent with R heart strain, and mild concentric LVH  - Continue comfort measures    #Hemodynamics  - Hemodynamically unstable, now off levophed  - continue morphine gtt for sedation and comfort    Pulmonary  #Acute hypoxic respiratory failure secondary to cardiac arrest  - AC/CMV  - Comfort measures, plan palliative extubation    #r/o PE  - EKG showing RBBB; T-wave in V2-V4; biphasic p waves in II and III  - TTE with evidence of R heart strain  - dc heparin gtt, comfort measures continue    #Pneumothorax  - likely iatrogenic 2/2 compressions  - pigtail in place      #Aspiration pneumonia  - Vancomycin and Zosyn (12/19/18-12/20/18)    Heme  #Thombocytopenia    Renal  #Pre-renal ANGELY  - FeNa 0.4% likely 2/2 hypoperfusion  - Stop trending, no lab draws, comfort measures    Neurologic  #Anoxic brain injury  - CT head showing anoxic brain injury  - Apnea test negative  - s/p vancomycin and zosyn (12/19/18-12/20/18), continue comfort measures  - Continue morphine gtt for sedation and comfort measures    F: No IVF  E: Replete electrolytes PRN  N: NPO    Dispo: MICU  Code: DNR- after multiple conversations between family and palliative team, decision to withdraw care and continue comfort measures only on 12/20/18. Plan for palliative extubation when additional family (sister) arrives. 62 y/o M heavy smoker with a history of COPD, NIDDM, and HTN, admitted to MICU post PEA arrest, ROSC achieved after 30 minutes. Course complicated by R pneumothorax s/p chest tube and heparin gtt for presumed PE. Now comfort care and off pressors with plan for palliative weaning.    Cardiovascular  #PEA arrest with ROSC likely 2/2 pulmonary embolus  - TTE showing RV dilation with reduced function, hyperkinetic apex consistent with R heart strain, and mild concentric LVH  - Continue comfort measures    #Hemodynamics  - Hemodynamically unstable, now off levophed  - continue morphine gtt (begin 1mg and uptitrate) for sedation and comfort    Pulmonary  #Acute hypoxic respiratory failure secondary to cardiac arrest  - AC/CMV  - Comfort measures, plan palliative extubation    #r/o PE  - EKG showing RBBB; T-wave in V2-V4; biphasic p waves in II and III  - TTE with evidence of R heart strain  - dc heparin gtt, comfort measures continue    #Pneumothorax  - likely iatrogenic 2/2 compressions  - pigtail in place      #Aspiration pneumonia  - Vancomycin and Zosyn (12/19/18-12/20/18)    Heme  #Thombocytopenia    Renal  #Pre-renal ANGELY  - FeNa 0.4% likely 2/2 hypoperfusion  - Stop trending, no lab draws, comfort measures    Neurologic  #Anoxic brain injury  - CT head showing anoxic brain injury  - Apnea test negative  - s/p vancomycin and zosyn (12/19/18-12/20/18), continue comfort measures  - Continue morphine gtt for sedation and comfort measures    F: No IVF  E: Replete electrolytes PRN  N: NPO    Dispo: MICU  Code: DNR- after multiple conversations between family and palliative team, decision to withdraw care and continue comfort measures only on 12/20/18. Plan for palliative extubation when additional family (sister) arrives. 64 y/o M heavy smoker with a history of COPD, NIDDM, and HTN, admitted to MICU post PEA arrest, ROSC achieved after 30 minutes. Course complicated by R pneumothorax s/p chest tube and heparin gtt for presumed PE. Now comfort care and off pressors with plan for palliative weaning.    Cardiovascular  #PEA arrest with ROSC likely 2/2 pulmonary embolus  - TTE showing RV dilation with reduced function, hyperkinetic apex consistent with R heart strain, and mild concentric LVH  - Continue comfort measures    #Hemodynamics  - Hemodynamically unstable, now off levophed  - s/p morphine gtt; continue dilaudid gtt for sedation and comfort    Pulmonary  #Acute hypoxic respiratory failure secondary to cardiac arrest  - AC/CMV  - Comfort measures, plan palliative extubation    #r/o PE  - EKG showing RBBB; T-wave in V2-V4; biphasic p waves in II and III  - TTE with evidence of R heart strain  - dc heparin gtt, comfort measures continue    #Pneumothorax  - likely iatrogenic 2/2 compressions  - pigtail in place      #Aspiration pneumonia  - Vancomycin and Zosyn (12/19/18-12/20/18)    Heme  #Thombocytopenia    Renal  #Pre-renal ANGELY  - FeNa 0.4% likely 2/2 hypoperfusion  - Stop trending, no lab draws, comfort measures    Neurologic  #Anoxic brain injury  - CT head showing anoxic brain injury  - Apnea test negative  - s/p vancomycin and zosyn (12/19/18-12/20/18), continue comfort measures  - s/p morphine gtt; continue dilaudid for sedation and comfort measures    F: No IVF  E: Replete electrolytes PRN  N: NPO    Dispo: MICU  Code: DNR- after multiple conversations between family and palliative team, decision to withdraw care and continue comfort measures only on 12/20/18. Plan for palliative extubation when additional family (sister) arrives.

## 2018-12-21 NOTE — PROGRESS NOTE ADULT - REASON FOR ADMISSION
Cardiac arrest

## 2018-12-21 NOTE — PROGRESS NOTE ADULT - PROBLEM SELECTOR PLAN 4
Plan for palliative extubation. Comfort measures only. No Mews. DNR DNI. Ongoing support being provided and will discuss further today.

## 2018-12-21 NOTE — PROGRESS NOTE ADULT - PROBLEM SELECTOR PLAN 3
Currently with occasional myoclonus on Keppra BID. Prognosis is grim. EEG done. Apnea testing showed minimal respiratory drive, but otherwise not expected to have any meaningful recovery. Discussed with Neurology and MICU. Updated the wife and discussed de escalation including but not limited to palliative extubation. Remains DNR.

## 2018-12-21 NOTE — PROGRESS NOTE ADULT - SUBJECTIVE AND OBJECTIVE BOX
HOSPITAL COURSE    Mr. Wei is a 64 yo man with history of COPD, T2DM, HTN and current smoker who presented to [facility] ED in cardiac arrest. Patient was seen by PMD with complaints of several weeks of worsening lethargy and shortness of breath. Patient was referred to ER, but patient wished to go to [facility] and took a cab with his wife. Patient was being helped exit the cab outside the ED and found to be in cardiac arrest, and CPR was initiated in the street. [facility], had compressions ongoing for about 30minutes and achieved ROSC at 12:26 PM on 12/18/18. Patient was intubated and started on levophed for pressure support. Chest tube was placed for R pneumothorax identified on CXR. EKG on admission showed RBBB and echo showed R heart strain, and patient was started on heparin gtt for suspected PE. Vancomycin and Zosyn were also started, as well as stress dose steroids and Keppra for myoclonic jerking motions. EEG was placed with no identified epileptiform movements. Patient underwent CT head which showed anoxic brain injury. Family discussions with wife and sister and palliative care team were ongoing, and family decided to pursue comfort care only with plan for palliative extubation.    OVERNIGHT EVENTS: Febrile to 103.3.    SUBJECTIVE / INTERVAL HPI: Patient seen and examined at bedside. Intubated. Morphine gtt. ROS unable to obtain.    VITAL SIGNS:  Vital Signs Last 24 Hrs  T(C): 37.4 (21 Dec 2018 05:02), Max: 39.6 (20 Dec 2018 22:23)  T(F): 99.3 (21 Dec 2018 05:02), Max: 103.3 (20 Dec 2018 22:23)  HR: 112 (21 Dec 2018 06:00) (68 - 152)  BP: --  BP(mean): --  RR: 20 (21 Dec 2018 06:00) (19 - 31)  SpO2: 96% (21 Dec 2018 06:00) (94% - 99%)    PHYSICAL EXAM:  GEN: intubated  ENT: MMM  Neck: No JVD  Cardiovascular: Regular rate and rhythm, +S1 S2. No murmurs, rubs, or gallops  Respiratory: No retractions, CTAB, no wheezing  Gastrointestinal:  Soft, non-tender, non-distended, +BS  Extremities: Cold hands and feet, capillary refill <2sec; 1+ pitting LE edema to mid-calves   Vascular: Radial and pedal pulses 2+ bilaterally  Neurologic: AAOx0, unable to assess; intubated      MEDICATIONS:  MEDICATIONS  (STANDING):  ALBUTerol/ipratropium for Nebulization 3 milliLiter(s) Nebulizer every 4 hours  chlorhexidine 0.12% Liquid 15 milliLiter(s) Oral Mucosa every 12 hours  chlorhexidine 2% Cloths 1 Application(s) Topical <User Schedule>  hydrocortisone sodium succinate Injectable 50 milliGRAM(s) IV Push every 6 hours  levETIRAcetam  IVPB 500 milliGRAM(s) IV Intermittent every 12 hours  morphine  Infusion 12 mG/Hr (12 mL/Hr) IV Continuous <Continuous>  norepinephrine Infusion 0.04 MICROgram(s)/kG/Min (3.75 mL/Hr) IV Continuous <Continuous>    MEDICATIONS  (PRN):      ALLERGIES:  Allergies    No Known Allergies

## 2018-12-21 NOTE — DISCHARGE NOTE FOR THE EXPIRED PATIENT - HOSPITAL COURSE
62 yo man with history of COPD, T2DM, HTN and current smoker who presented to [facility] ED in cardiac arrest. Patient was seen by PMD with complaints of several weeks of worsening lethargy and shortness of breath. Patient was referred to ER, but patient wished to go to [facility] and took a cab with his wife. Patient was being helped exit the cab outside the ED and found to be in cardiac arrest, and CPR was initiated in the street. Patient presented in [facility], had compressions ongoing for about 30minutes and achieved ROSC at 12:26 PM on 12/18/18. Patient was intubated and started on levophed for pressure support. Chest tube was placed for R pneumothorax identified on CXR. EKG on admission showed RBBB and echo showed R heart strain, and patient was started on heparin gtt for suspected PE. Vancomycin and Zosyn were also started, as well as stress dose steroids and Keppra for myoclonic jerking motions. EEG was placed with no identified epileptiform movements. Patient underwent CT head which showed anoxic brain injury. Family discussions with wife and sister and palliative care team were ongoing, and family decided to pursue comfort care only with plan for palliative extubation.    Mr. Wei underwent palliative extubation with his wife and sister at bedside. He passed on 12/21 at 15:39.

## 2018-12-22 LAB
CULTURE RESULTS: SIGNIFICANT CHANGE UP
SPECIMEN SOURCE: SIGNIFICANT CHANGE UP

## 2018-12-24 DIAGNOSIS — G93.1 ANOXIC BRAIN DAMAGE, NOT ELSEWHERE CLASSIFIED: ICD-10-CM

## 2018-12-24 DIAGNOSIS — J93.0 SPONTANEOUS TENSION PNEUMOTHORAX: ICD-10-CM

## 2018-12-24 DIAGNOSIS — J44.9 CHRONIC OBSTRUCTIVE PULMONARY DISEASE, UNSPECIFIED: ICD-10-CM

## 2018-12-24 DIAGNOSIS — I46.8 CARDIAC ARREST DUE TO OTHER UNDERLYING CONDITION: ICD-10-CM

## 2018-12-24 DIAGNOSIS — I10 ESSENTIAL (PRIMARY) HYPERTENSION: ICD-10-CM

## 2018-12-24 DIAGNOSIS — J93.9 PNEUMOTHORAX, UNSPECIFIED: ICD-10-CM

## 2018-12-24 DIAGNOSIS — J96.01 ACUTE RESPIRATORY FAILURE WITH HYPOXIA: ICD-10-CM

## 2018-12-24 DIAGNOSIS — E11.9 TYPE 2 DIABETES MELLITUS WITHOUT COMPLICATIONS: ICD-10-CM

## 2018-12-24 DIAGNOSIS — Z72.0 TOBACCO USE: ICD-10-CM

## 2018-12-24 DIAGNOSIS — J45.909 UNSPECIFIED ASTHMA, UNCOMPLICATED: ICD-10-CM

## 2018-12-24 DIAGNOSIS — N17.9 ACUTE KIDNEY FAILURE, UNSPECIFIED: ICD-10-CM

## 2018-12-24 DIAGNOSIS — E87.2 ACIDOSIS: ICD-10-CM

## 2018-12-24 DIAGNOSIS — I26.99 OTHER PULMONARY EMBOLISM WITHOUT ACUTE COR PULMONALE: ICD-10-CM

## 2018-12-24 DIAGNOSIS — G40.409 OTHER GENERALIZED EPILEPSY AND EPILEPTIC SYNDROMES, NOT INTRACTABLE, WITHOUT STATUS EPILEPTICUS: ICD-10-CM

## 2018-12-24 LAB
CULTURE RESULTS: SIGNIFICANT CHANGE UP
CULTURE RESULTS: SIGNIFICANT CHANGE UP
SPECIMEN SOURCE: SIGNIFICANT CHANGE UP
SPECIMEN SOURCE: SIGNIFICANT CHANGE UP

## 2019-01-09 PROCEDURE — 85379 FIBRIN DEGRADATION QUANT: CPT

## 2019-01-09 PROCEDURE — 99291 CRITICAL CARE FIRST HOUR: CPT | Mod: 25

## 2019-01-09 PROCEDURE — 96365 THER/PROPH/DIAG IV INF INIT: CPT | Mod: 59

## 2019-01-09 PROCEDURE — 93306 TTE W/DOPPLER COMPLETE: CPT

## 2019-01-09 PROCEDURE — 85045 AUTOMATED RETICULOCYTE COUNT: CPT

## 2019-01-09 PROCEDURE — 71045 X-RAY EXAM CHEST 1 VIEW: CPT

## 2019-01-09 PROCEDURE — 86022 PLATELET ANTIBODIES: CPT

## 2019-01-09 PROCEDURE — 83615 LACTATE (LD) (LDH) ENZYME: CPT

## 2019-01-09 PROCEDURE — 85610 PROTHROMBIN TIME: CPT

## 2019-01-09 PROCEDURE — 85027 COMPLETE CBC AUTOMATED: CPT

## 2019-01-09 PROCEDURE — 82553 CREATINE MB FRACTION: CPT

## 2019-01-09 PROCEDURE — 84443 ASSAY THYROID STIM HORMONE: CPT

## 2019-01-09 PROCEDURE — 84484 ASSAY OF TROPONIN QUANT: CPT

## 2019-01-09 PROCEDURE — 94640 AIRWAY INHALATION TREATMENT: CPT

## 2019-01-09 PROCEDURE — 84132 ASSAY OF SERUM POTASSIUM: CPT

## 2019-01-09 PROCEDURE — 92950 HEART/LUNG RESUSCITATION CPR: CPT

## 2019-01-09 PROCEDURE — 96375 TX/PRO/DX INJ NEW DRUG ADDON: CPT | Mod: 59

## 2019-01-09 PROCEDURE — 84100 ASSAY OF PHOSPHORUS: CPT

## 2019-01-09 PROCEDURE — 84300 ASSAY OF URINE SODIUM: CPT

## 2019-01-09 PROCEDURE — 83605 ASSAY OF LACTIC ACID: CPT

## 2019-01-09 PROCEDURE — 80202 ASSAY OF VANCOMYCIN: CPT

## 2019-01-09 PROCEDURE — 87040 BLOOD CULTURE FOR BACTERIA: CPT

## 2019-01-09 PROCEDURE — 81001 URINALYSIS AUTO W/SCOPE: CPT

## 2019-01-09 PROCEDURE — 87106 FUNGI IDENTIFICATION YEAST: CPT

## 2019-01-09 PROCEDURE — 82570 ASSAY OF URINE CREATININE: CPT

## 2019-01-09 PROCEDURE — 82550 ASSAY OF CK (CPK): CPT

## 2019-01-09 PROCEDURE — 84295 ASSAY OF SERUM SODIUM: CPT

## 2019-01-09 PROCEDURE — 80048 BASIC METABOLIC PNL TOTAL CA: CPT

## 2019-01-09 PROCEDURE — 94002 VENT MGMT INPAT INIT DAY: CPT

## 2019-01-09 PROCEDURE — 80074 ACUTE HEPATITIS PANEL: CPT

## 2019-01-09 PROCEDURE — 87086 URINE CULTURE/COLONY COUNT: CPT

## 2019-01-09 PROCEDURE — 83735 ASSAY OF MAGNESIUM: CPT

## 2019-01-09 PROCEDURE — 85730 THROMBOPLASTIN TIME PARTIAL: CPT

## 2019-01-09 PROCEDURE — 36415 COLL VENOUS BLD VENIPUNCTURE: CPT

## 2019-01-09 PROCEDURE — 82803 BLOOD GASES ANY COMBINATION: CPT

## 2019-01-09 PROCEDURE — 95951: CPT

## 2019-01-09 PROCEDURE — 70450 CT HEAD/BRAIN W/O DYE: CPT

## 2019-01-09 PROCEDURE — 83036 HEMOGLOBIN GLYCOSYLATED A1C: CPT

## 2019-01-09 PROCEDURE — 83010 ASSAY OF HAPTOGLOBIN QUANT: CPT

## 2019-01-09 PROCEDURE — 80053 COMPREHEN METABOLIC PANEL: CPT

## 2019-01-09 PROCEDURE — 82962 GLUCOSE BLOOD TEST: CPT

## 2019-01-09 PROCEDURE — 31500 INSERT EMERGENCY AIRWAY: CPT

## 2019-01-09 PROCEDURE — 83935 ASSAY OF URINE OSMOLALITY: CPT

## 2019-01-09 PROCEDURE — 82330 ASSAY OF CALCIUM: CPT

## 2019-01-09 PROCEDURE — 83880 ASSAY OF NATRIURETIC PEPTIDE: CPT

## 2019-01-17 PROBLEM — Z00.00 ENCOUNTER FOR PREVENTIVE HEALTH EXAMINATION: Status: ACTIVE | Noted: 2019-01-17

## 2023-07-27 NOTE — PROGRESS NOTE ADULT - PROBLEM SELECTOR PROBLEM 4
DNR (do not resuscitate) Alert-The patient is alert, awake and responds to voice. The patient is oriented to time, place, and person. The triage nurse is able to obtain subjective information. Goals of care, counseling/discussion

## 2024-12-17 NOTE — PATIENT PROFILE ADULT - BRADEN NUTRITION
History: Follow-up diabetes: Thinks controlled:Yes  checks BS: No, the patient reports that she does not have a blood glucose monitor at home.   Takes Metformin 1000 mg 1x week for the past month and no longer on mounjaro 2.5 mg , not missing dose of medication, denies side effects medication  Eating healthy:Yes, has made lifestyle changes   Exercise:Yes is trying to do a little more.   Retinal exam: yes in 2024, told needed to see a ophthomology   Patient denies any fatigue, night sweats, weight changes, chest pain, palpitations, cough, shortness of breath, abdominal pain, nausea, vomiting, diarrhea, constipation, dysuria, polyuria, polyphagia, leg swelling, numbness, tingling, weakness of extremities, headache, vision changes, sores of feet, rashes, episodes of hypoglyemia, dizziness, lightheadedness  Assessment: established condition   Plan: 12/10/2024 A1c 6.1, microalbumin/creatinine urine 11.3, diabetes is controlled. Pt wishes to continue with lifestyle changes instead of restarting monjoro. Reviewed goals of self-management and encouraged to check blood sugars 2x daily.  Retinal exam is not up-to-date and next due now. Referral provided.  Advised on importance of following a low carbohydrate, heart healthy eating plan, 30 minutes exercise daily and  maintaining healthy weight.  Continue regimen of: lifestyle changes only. Referral to optho provided  Recheck A1c in 3 months.  Orders:    SERVICE TO OPHTHALMOLOGY; Future     (1) very poor